# Patient Record
Sex: MALE | Race: WHITE | Employment: FULL TIME | URBAN - METROPOLITAN AREA
[De-identification: names, ages, dates, MRNs, and addresses within clinical notes are randomized per-mention and may not be internally consistent; named-entity substitution may affect disease eponyms.]

---

## 2017-01-03 ENCOUNTER — APPOINTMENT (OUTPATIENT)
Dept: PHYSICAL THERAPY | Age: 47
End: 2017-01-03
Payer: OTHER GOVERNMENT

## 2017-01-06 ENCOUNTER — HOSPITAL ENCOUNTER (OUTPATIENT)
Dept: PHYSICAL THERAPY | Age: 47
Discharge: HOME | End: 2017-01-06
Payer: OTHER GOVERNMENT

## 2017-01-06 PROCEDURE — 97113 AQUATIC THERAPY/EXERCISES: CPT

## 2017-01-06 NOTE — PROGRESS NOTES
PT DAILY TREATMENT NOTE 8-    Patient Name: Candace Henderson  Date:2017  : 1970  [x]  Patient  Verified  Payor: [address] [place] [address] / Plan: [address] [place] [address] / Product Type: Workers Comp /    In time:3:07pm  Out time:3:35pm  Total Treatment Time (min): 28  Visit #: 4 of 8    Treatment Area: Other intervertebral disc degeneration, lumbar region [M51.36]  Right hip pain [M25.551]  Muscle spasm of back [M62.830]    SUBJECTIVE  Pain Level (0-10 scale): 1  Any medication changes, allergies to medications, adverse drug reactions, diagnosis change, or new procedure performed?: [x] No    [] Yes (see summary sheet for update)  Subjective functional status/changes:   [] No changes reported  \"Its doing better today, but I haven't really been doing anything lately. \"    OBJECTIVE     28 min Therapeutic Exercise:  [x] See flow sheet : Aquatics   Rationale: increase ROM and increase strength to improve the patients ability to improve ADL ease. With   [] TE   [] TA   [] neuro   [] other: Patient Education: [x] Review HEP    [] Progressed/Changed HEP based on:   [] positioning   [] body mechanics   [] transfers   [] heat/ice application    [] other:      Other Objective/Functional Measures:     Reports increased \"tension\" in low back post trunk rotations and QL stretch, but did not report until completed treatment     Pain Level (0-10 scale) post treatment: 1    ASSESSMENT/Changes in Function: Pt reports increased discomfort post treatment today as above, but is able to complete all interventions without apparent limitations. Limited subjective progress to date. Will assess in upcoming visits regarding Dc vs progression to land.     Patient will continue to benefit from skilled PT services to modify and progress therapeutic interventions, address functional mobility deficits, address ROM deficits, address strength deficits, analyze and address soft tissue restrictions, analyze and cue movement patterns and analyze and modify body mechanics/ergonomics to attain remaining goals. []  See Plan of Care  []  See progress note/recertification  []  See Discharge Summary         Progress towards goals / Updated goals:  Updated Goals: to be achieved in 4 weeks:  1. Pt will improve FOTO 5 points to increase ease of ADLs-not met score 43. 11/23/16  2. Pt will demonstrate ability to perform 30# box lift and carry 5 rounds without LBP provocation to improve ease of work task completion. 3. Pt will demonstrate ability to perform quadruped alternating progression with good stability to facilitate ability to maintain stability while maneuvering during work tasks. 4. Pt will report ability to ambulate up to 1 mile without pain provocation to improve ease of community environment negotiation.     PLAN  [x]  Upgrade activities as tolerated     [x]  Continue plan of care  []  Update interventions per flow sheet       []  Discharge due to:_  []  Other:_      Romina Eng, PT, DPT, ATC, CSCS 1/6/2017  3:36 PM

## 2017-01-09 ENCOUNTER — APPOINTMENT (OUTPATIENT)
Dept: PHYSICAL THERAPY | Age: 47
End: 2017-01-09
Payer: OTHER GOVERNMENT

## 2017-01-10 ENCOUNTER — HOSPITAL ENCOUNTER (OUTPATIENT)
Dept: PHYSICAL THERAPY | Age: 47
Discharge: HOME | End: 2017-01-10
Payer: OTHER GOVERNMENT

## 2017-01-10 PROCEDURE — 97113 AQUATIC THERAPY/EXERCISES: CPT

## 2017-01-10 NOTE — PROGRESS NOTES
PT DAILY TREATMENT NOTE     Patient Name: Cyndee Diop  DYBW:  : 1970  [x]  Patient  Verified  Payor: [address] [place] [address] / Plan:  [facility] / Product Type: Workers Comp /    In [address] time:3:30  Total Treatment Time (min): 25  Visit #: 5 of 8    Treatment Area: Other intervertebral disc degeneration, lumbar region [M51.36]  Right hip pain [M25.551]  Muscle spasm of back [M62.830]    SUBJECTIVE  Pain Level (0-10 scale): 1  Any medication changes, allergies to medications, adverse drug reactions, diagnosis change, or new procedure performed?: [x] No    [] Yes (see summary sheet for update)  Subjective functional status/changes:   [] No changes reported  \"I've found that the less activity I do the better my back feels. I noticed after not having aqua therapy and then returning my back flared up right after\"    OBJECTIVE    25 min Therapeutic Exercise:  [] See flow sheet :AQUATICS   Rationale: increase ROM, increase strength, improve coordination and increase proprioception to improve the patients ability to improve core activation with daily activities            With   [] TE   [] TA   [] neuro   [] other: Patient Education: [x] Review HEP    [] Progressed/Changed HEP based on:   [] positioning   [] body mechanics   [] transfers   [] heat/ice application    [] other:      Other Objective/Functional Measures: good squat mechanics noted today without reports of lumbar pain     Pain Level (0-10 scale) post treatment: 1    ASSESSMENT/Changes in Function: Pt tolerates aquatic therex well with no direct reports of increased pain during session. He does report some fear avoidance behaviors during session noting he feels like he \"wants to protect\" low back. Received authorization for land based therapy, will transition next session for further functional progression.     Patient will continue to benefit from skilled PT services to modify and progress therapeutic interventions, address functional mobility deficits, address ROM deficits, address strength deficits, analyze and address soft tissue restrictions, analyze and cue movement patterns, analyze and modify body mechanics/ergonomics, assess and modify postural abnormalities and instruct in home and community integration to attain remaining goals. []  See Plan of Care  []  See progress note/recertification  []  See Discharge Summary         Progress towards goals / Updated goals:  Updated Goals: to be achieved in 4 weeks:  1. Pt will improve FOTO 5 points to increase ease of ADLs-not met score 43. 11/23/16  2. Pt will demonstrate ability to perform 30# box lift and carry 5 rounds without LBP provocation to improve ease of work task completion. 3. Pt will demonstrate ability to perform quadruped alternating progression with good stability to facilitate ability to maintain stability while maneuvering during work tasks. 4. Pt will report ability to ambulate up to 1 mile without pain provocation to improve ease of community environment negotiation.  Reporting limited activity/fear avoidance recently 1/10/17    PLAN  []  Upgrade activities as tolerated     [x]  Continue plan of care  []  Update interventions per flow sheet       []  Discharge due to:_  []  Other:_      Jocelynn Cruz DPT 1/10/2017  3:07 PM    Future Appointments  Date Time Provider [place]   1/12/2017 11:30 AM Linsey Yuen MD [place] [id number]   1/12/2017 1:50 PM Lois Diallo  [address]   1/13/2017 2:30 PM [address] [place] [address] [facility]   1/13/2017 3:30 PM [facility]

## 2017-01-12 ENCOUNTER — OFFICE VISIT (OUTPATIENT)
Dept: ORTHOPEDIC SURGERY | Age: 47
End: 2017-01-12

## 2017-01-12 VITALS
TEMPERATURE: 97.8 F | HEIGHT: 68 IN | BODY MASS INDEX: 36.8 KG/M2 | DIASTOLIC BLOOD PRESSURE: 87 MMHG | SYSTOLIC BLOOD PRESSURE: 140 MMHG | HEART RATE: 87 BPM | WEIGHT: 242.8 LBS

## 2017-01-12 VITALS
SYSTOLIC BLOOD PRESSURE: 142 MMHG | WEIGHT: 242 LBS | HEIGHT: 68 IN | HEART RATE: 72 BPM | BODY MASS INDEX: 36.68 KG/M2 | DIASTOLIC BLOOD PRESSURE: 87 MMHG | TEMPERATURE: 98.2 F

## 2017-01-12 DIAGNOSIS — Z98.890 STATUS POST ROTATOR CUFF REPAIR: ICD-10-CM

## 2017-01-12 DIAGNOSIS — M62.830 MUSCLE SPASM OF BACK: ICD-10-CM

## 2017-01-12 DIAGNOSIS — M75.121 COMPLETE TEAR OF RIGHT ROTATOR CUFF: Primary | ICD-10-CM

## 2017-01-12 DIAGNOSIS — M54.50 MIDLINE LOW BACK PAIN WITHOUT SCIATICA, UNSPECIFIED CHRONICITY: ICD-10-CM

## 2017-01-12 DIAGNOSIS — S33.5XXD SPRAIN OF LUMBAR REGION, SUBSEQUENT ENCOUNTER: ICD-10-CM

## 2017-01-12 DIAGNOSIS — M51.36 DDD (DEGENERATIVE DISC DISEASE), LUMBAR: ICD-10-CM

## 2017-01-12 DIAGNOSIS — M47.816 LUMBAR FACET ARTHROPATHY: Primary | ICD-10-CM

## 2017-01-12 RX ORDER — METAXALONE 800 MG/1
800 TABLET ORAL 3 TIMES DAILY
Qty: 75 TAB | Refills: 2 | Status: SHIPPED | OUTPATIENT
Start: 2017-01-12

## 2017-01-12 RX ORDER — TRAMADOL HYDROCHLORIDE 50 MG/1
50 TABLET ORAL
Qty: 40 TAB | Refills: 0 | Status: SHIPPED | OUTPATIENT
Start: 2017-01-12 | End: 2017-01-20

## 2017-01-12 RX ORDER — DICLOFENAC SODIUM 75 MG/1
75 TABLET, DELAYED RELEASE ORAL 2 TIMES DAILY WITH MEALS
Qty: 60 TAB | Refills: 2 | Status: SHIPPED | OUTPATIENT
Start: 2017-01-12 | End: 2017-01-12 | Stop reason: CLARIF

## 2017-01-12 RX ORDER — DICLOFENAC SODIUM 75 MG/1
75 TABLET, DELAYED RELEASE ORAL 2 TIMES DAILY WITH MEALS
Qty: 60 TAB | Refills: 2 | Status: SHIPPED | OUTPATIENT
Start: 2017-01-12

## 2017-01-12 NOTE — PATIENT INSTRUCTIONS
Low Back Arthritis: Exercises  Your Care Instructions  Here are some examples of typical rehabilitation exercises for your condition. Start each exercise slowly. Ease off the exercise if you start to have pain. Your doctor or physical therapist will tell you when you can start these exercises and which ones will work best for you. When you are not being active, find a comfortable position for rest. Some people are comfortable on the floor or a medium-firm bed with a small pillow under their head and another under their knees. Some people prefer to lie on their side with a pillow between their knees. Don't stay in one position for too long. Take short walks (10 to 20 minutes) every 2 to 3 hours. Avoid slopes, hills, and stairs until you feel better. Walk only distances you can manage without pain, especially leg pain. How to do the exercises  Pelvic tilt    1. Lie on your back with your knees bent. 2. \"Brace\" your stomach--tighten your muscles by pulling in and imagining your belly button moving toward your spine. 3. Press your lower back into the floor. You should feel your hips and pelvis rock back. 4. Hold for 6 seconds while breathing smoothly. 5. Relax and allow your pelvis and hips to rock forward. 6. Repeat 8 to 12 times. Back stretches    1. Get down on your hands and knees on the floor. 2. Relax your head and allow it to droop. Round your back up toward the ceiling until you feel a nice stretch in your upper, middle, and lower back. Hold this stretch for as long as it feels comfortable, or about 15 to 30 seconds. 3. Return to the starting position with a flat back while you are on your hands and knees. 4. Let your back sway by pressing your stomach toward the floor. Lift your buttocks toward the ceiling. 5. Hold this position for 15 to 30 seconds. 6. Repeat 2 to 4 times. Follow-up care is a key part of your treatment and safety.  Be sure to make and go to all appointments, and call your doctor if you are having problems. It's also a good idea to know your test results and keep a list of the medicines you take. Where can you learn more? Go to http://aneudy-swati.info/. Enter R775 in the search box to learn more about \"Low Back Arthritis: Exercises. \"  Current as of: May 23, 2016  Content Version: 11.1  © 5743-3685 [employer]. Care instructions adapted under license by [employer] (which disclaims liability or warranty for this information). If you have questions about a medical condition or this instruction, always ask your healthcare professional. [place] [address] any warranty or liability for your use of this information. Learning About Medial Branch Block and Neurotomy  What are medial branch block and neurotomy? Facet joints connect your vertebrae to each other. Problems in these joints can cause chronic (long-term) pain in the neck or back. They can sometimes affect the shoulders, arms, buttocks, or legs. Medial branch nerves are the nerves that carry many of the pain messages from your facet joints. Radiofrequency medial branch neurotomy is a type of medial branch neurotomy that is used to relieve arthritis pain. It uses radio waves to damage nerves in your neck or back so that they can no longer send pain messages to your brain. Before your doctor knows if a neurotomy will help you, he or she will do a medial branch block to find out if certain nerves are the ones that are a source of your pain. You will need two separate visits to the outpatient center or hospital to have both procedures. How is a medial branch block done? The doctor will use a tiny needle to numb the skin where you will get the block. Then he or she puts the block needle into the numbed area. You may feel some pressure, but you should not feel pain.  Using fluoroscopy (live X-ray) to guide the needle, the doctor injects medicine onto one or more nerves to make them numb. If you get relief from your pain in the next 4 to 6 hours, it's a sign that those nerves may be contributing to your pain. The relief will last only a short time. You may then have a medial branch neurotomy at a later visit to try to get longer relief. It takes 20 to 30 minutes to get the block. You can go home after the doctor watches you for about an hour. You will get instructions on how to report how much pain you have when you are at home. You will need someone to drive you home. How is medial branch neurotomy done? The doctor will use a tiny needle to numb the skin where you will get the neurotomy. Then he or she puts the neurotomy needle into the numbed area. You may feel some pressure. Using fluoroscopy (live X-ray) to guide the needle, the doctor sends radio waves through the needle to the nerve for 60 to 90 seconds. The radio waves heat the nerve, which damages it. The doctor may do this several times. And he or she may treat more than one nerve. It takes 45 to 90 minutes to get a neurotomy, depending on how many nerves are heated. You will probably go home 30 to 60 minutes later. You will need someone to drive you home. What can you expect after a neurotomy? You may feel a little sore or tender at the injection site at first. But after a successful neurotomy, most people have pain relief right away. It often lasts for 9 to 12 months or longer. Sometimes the pain relief is permanent. If your pain does come back, it may mean that the damaged nerve has healed and can send pain messages again. Or it can mean that a different nerve is causing pain. Your doctor will discuss your options with you. Follow-up care is a key part of your treatment and safety. Be sure to make and go to all appointments, and call your doctor if you are having problems. It's also a good idea to know your test results and keep a list of the medicines you take. Where can you learn more?   Go to http://aneudy-swati.info/. Enter T504 in the search box to learn more about \"Learning About Medial Branch Block and Neurotomy. \"  Current as of: February 19, 2016  Content Version: 11.1  © 5923-6410 [employer]. Care instructions adapted under license by [employer] (which disclaims liability or warranty for this information). If you have questions about a medical condition or this instruction, always ask your healthcare professional. [address] any warranty or liability for your use of this information.

## 2017-01-12 NOTE — PROGRESS NOTES
[facility]  [address] [place], [address]  Phone: [phone]  Fax: [phone]          HISTORY OF PRESENT ILLNESS:  Kayleigh Linda is a 48 y.o. male with history of lumbar pain. Pt wears a multi-tool on his belt and thinks it may be hitting his back when climbing stairs, causing right lower back pain. He continues with physical therapy at this time. Pt had right shoulder surgery with Dr. Emilia Albrecht 4 months ago. He followed up with Dr. Emilia Albrecht today and will continue with light duty until 03/2017. Of note Dr. Emilia Albrecht put restrictions of up to 10 lbs of pushing, pulling, and limiting climbing with the right shoulder and use of the right arm. He will continue right shoulder physical therapy for 1 month. Pt has been taking ibuprofen 200 mg 3 x daily. He states that he also takes Skelaxin with his ibuprofen. Pt admits to relief when taking pain medication after his right shoulder surgery. Pt at this time desires proceed with medication evaluation. Of note, Pt works as a . Pain Scale: 2/10    PCP: Milton Garibay MD (Inactive)       Past Medical History   Diagnosis Date    Chronic pain      back pain    Elevated cholesterol     Hypertension     Sleep apnea      CPAP        Social History     Social History    Marital status:      Spouse name: N/A    Number of children: N/A    Years of education: N/A     Occupational History    Not on file. Social History Main Topics    Smoking status: Former Smoker     Packs/day: 1.00     Years: 20.00     Quit date: 1/1/2003    Smokeless tobacco: Never Used    Alcohol use No    Drug use: No    Sexual activity: Not on file     Other Topics Concern    Not on file     Social History Narrative       Current Outpatient Prescriptions   Medication Sig Dispense Refill    traMADol (ULTRAM) 50 mg tablet Take 1 Tab by mouth every six (6) hours as needed for Pain.  Max Daily Amount: 200 mg. 40 Tab 0    metaxalone (SKELAXIN) 800 mg tablet Take 1 Tab by mouth three (3) times daily. Take as needed for muscle spasm  Indications: MUSCLE SPASM 75 Tab 2    diclofenac EC (VOLTAREN) 75 mg EC tablet Take 1 Tab by mouth two (2) times daily (with meals). 60 Tab 2    traMADol (ULTRAM) 50 mg tablet Take 1 Tab by mouth every six (6) hours as needed for Pain. Max Daily Amount: 200 mg. 40 Tab 0    ibuprofen (MOTRIN) 200 mg tablet Take 200 mg by mouth.  lidocaine (LIDODERM) 5 % Apply patch to the affected area for 12 hours a day and remove for 12 hours a day. 30 Patch 0    traMADol (ULTRAM) 50 mg tablet Take 1 Tab by mouth every six (6) hours as needed for Pain. Max Daily Amount: 200 mg. 40 Tab 0    traMADol (ULTRAM) 50 mg tablet Take 1 Tab by mouth every six (6) hours as needed for Pain. Max Daily Amount: 200 mg. 40 Tab 0    fenofibrate micronized (LOFIBRA) 134 mg capsule 134 mg daily. 1    fluticasone (FLONASE) 50 mcg/actuation nasal spray 2 Sprays by Both Nostrils route as needed. 1    lisinopril (PRINIVIL, ZESTRIL) 40 mg tablet Take 40 mg by mouth daily. Takes at 11 pm      traMADol (ULTRAM) 50 mg tablet Take 1 Tab by mouth every six (6) hours as needed for Pain. Max Daily Amount: 200 mg. 40 Tab 0    HYDROcodone-acetaminophen (NORCO) 7.5-325 mg per tablet TAKE 1-2 TABLETS BY MOUTH NIGHTLY AS NEEDED FOR PAIN. MAX DAILY 2 TABS  0    traMADol (ULTRAM) 50 mg tablet Take 1 Tab by mouth every six (6) hours as needed for Pain. Max Daily Amount: 200 mg. 40 Tab 0    albuterol (PROVENTIL HFA, VENTOLIN HFA, PROAIR HFA) 90 mcg/actuation inhaler Take  by inhalation.  methocarbamol (ROBAXIN) 500 mg tablet Take 2 Tabs by mouth four (4) times daily. 240 Tab 1    oxyCODONE-acetaminophen (PERCOCET) 5-325 mg per tablet Take 1 Tab by mouth every four (4) hours as needed for Pain. Max Daily Amount: 6 Tabs.  40 Tab 0    oxyCODONE-acetaminophen (PERCOCET)  mg per tablet Take 1 Tab by mouth every four (4) hours as needed for Pain. Max Daily Amount: 6 Tabs. 40 Tab 0    HYDROmorphone (DILAUDID) 2 mg tablet Take 1 Tab by mouth every four (4) hours as needed for Pain. Max Daily Amount: 12 mg. 40 Tab 0    HYDROmorphone (DILAUDID) 2 mg tablet Take 1 Tab by mouth every four (4) hours as needed for Pain. Max Daily Amount: 12 mg. 40 Tab 0       No Known Allergies      REVIEW OF SYSTEMS    Constitutional: Negative for fever, chills, or weight change. Respiratory: Negative for cough or shortness of breath. Cardiovascular: Negative for chest pain or palpitations. Gastrointestinal: Negative for acid reflux, change in bowel habits, or constipation. Genitourinary: Negative for dysuria and flank pain. Musculoskeletal: Positive for lumbar pain. Skin: Negative for rash. Neurological: Negative for headaches, dizziness, or numbness. Endo/Heme/Allergies: Negative for increased bruising. Psychiatric/Behavioral: Negative for difficulty with sleep. PHYSICAL EXAMINATION  Visit Vitals    /87 (BP 1 Location: Left arm, BP Patient Position: Sitting)    Pulse 72    Temp 98.2 °F (36.8 °C) (Oral)    Ht 5' 8\" (1.727 m)    Wt 242 lb (109.8 kg)    BMI 36.8 kg/m2       Constitutional: Awake, alert, and in no acute distress  Neurological: 1+ symmetrical DTRs in the upper extremities. 1+ symmetrical DTRs in the lower extremities. Sensation to light touch is intact. Negative Chasity's sign bilaterally. Skin: warm, dry, and intact. Musculoskeletal: Tenderness to palpation in the lower lumbar region. Moderate pain with extension and axial loading. No pain with internal or external rotation of his hips. Negative straight leg raise bilaterally.      Hip Flex  Quads Hamstrings Ankle DF EHL Ankle PF   Right +4/5 +4/5 +4/5 +4/5 +4/5 +4/5   Left +4/5 +4/5 +4/5 +4/5 +4/5 +4/5     IMAGING:    Lumbar Spine MRI from 10/15/2016 was personally reviewed with the Pt and demonstrated:  IMPRESSION:  L5-S1: Bilateral facet joint arthrosis with hypertrophy mildly narrowing the neural foramina. L4-5: Right facet joint hypertrophy and mild right disc bulging mildly narrowing the right neural foramen. Inna Crum was seen today for back pain and follow-up. Diagnoses and all orders for this visit:    Lumbar facet arthropathy ([address]  -     SCHEDULE SURGERY  -     Discontinue: diclofenac EC (VOLTAREN) 75 mg EC tablet; Take 1 Tab by mouth two (2) times daily (with meals). -     diclofenac EC (VOLTAREN) 75 mg EC tablet; Take 1 Tab by mouth two (2) times daily (with meals). Muscle spasm of back  -     metaxalone (SKELAXIN) 800 mg tablet; Take 1 Tab by mouth three (3) times daily. Take as needed for muscle spasm  Indications: MUSCLE SPASM    Midline low back pain without sciatica, unspecified chronicity  -     metaxalone (SKELAXIN) 800 mg tablet; Take 1 Tab by mouth three (3) times daily. Take as needed for muscle spasm  Indications: MUSCLE SPASM    DDD (degenerative disc disease), lumbar         IMPRESSION AND PLAN:  Karolyn Cross is a 48 y.o. male with history of lumbar pain. Pt wears a multi-tool on his belt and thinks it may be hitting his back when climbing stairs, causing right lower back pain. He continues with physical therapy at this time. Pt had shoulder surgery with Dr. Migdalia Arnold 4 months ago and will continue with light duty for the next 6 weeks. 1) Pt was given information on lumbar arthritis exercises. 2) I recommended the Pt lose weight. 3) Discussed treatment options with the Pt, including steroid injections and a RFA. 4) Pt was scheduled for a right L5-S1 facet injection. 5) He was prescribed Voltaren 75 mg 1 tab BID with food to take in place of ibuprofen. 6) Pt received a refill of Skelaxin 800 mg 1 tab TID prn muscle spasm. 7) He will discontinue physical therapy since I feel he has reached maximum medical improvement.    8) Mr. Kathie Barrera has a reminder for a \"due or due soon\" health maintenance. I have asked that he contact his primary care provider, Jersey Marroquin MD (Inactive),  for follow-up on this health maintenance. 9)  reviewed. 10) Pt will follow-up in 1 month.       Written by Maria Isabel Mathur, as dictated by Estrellita Momin MD.

## 2017-01-12 NOTE — PROGRESS NOTES
Adolfo Born  1970   Chief Complaint   Patient presents with    Shoulder Pain     Right        HISTORY OF PRESENT ILLNESS  Adolfo Eisenberg is a 48 y.o. male who presents today for reevaluation of right shoulder. He is s/p right shoulder arthroscopic massive rotator cuff repair and open subscapularis repair on 9/2/16. He is back at work with restrictions. He states his shoulder is doing well. He has continued with PT. Patient denies any fever, chills, chest pain, shortness of breath or calf pain. There are no new illness or injuries to report since last seen in the office. There are no changes to medications, allergies, family or social history. PHYSICAL EXAM:   Visit Vitals    /87    Pulse 87    Temp 97.8 °F (36.6 °C) (Oral)    Ht 5' 8\" (1.727 m)    Wt 242 lb 12.8 oz (110.1 kg)    BMI 36.92 kg/m2     The patient is a well-developed, well-nourished male   in no acute distress. The patient is alert and oriented times three. The patient is alert and oriented times three. Mood and affect are normal.  LYMPHATIC: lymph nodes are not enlarged and are within normal limits  SKIN: normal in color and non tender to palpation. There are no bruises or abrasions noted. NEUROLOGICAL: Motor sensory exam is within normal limits. Reflexes are equal bilaterally. There is normal sensation to pinprick and light touch  ORTHOPEDIC EXAM:  Examination Right shoulder   Skin Intact   AC joint tenderness -   Biceps tenderness -   Forward flexion/Elevation    Active abduction    Glenohumeral abduction 90   External rotation ROM 90   Internal rotation ROM 70   Apprehension -   Kathryns Relocation -   Jerk -   Load and Shift -   Obriens -   Speeds -   Impingement sign -   Supraspinatus/Empty Can -, 5/5   External Rotation Strength -, 5/5   Lift Off/Belly Press -, 5/5   Neurovascular Intact         IMPRESSION:      ICD-10-CM ICD-9-CM    1.  Complete tear of right rotator cuff M75.121 727.61 REFERRAL TO PHYSICAL THERAPY      traMADol (ULTRAM) 50 mg tablet   2. Status post rotator cuff repair Z98.890 V45.89 REFERRAL TO PHYSICAL THERAPY      traMADol (ULTRAM) 50 mg tablet        PLAN: 1. He will continue work with restrictions and gradually increase his activities as tolerated. 2. He will follow up in about 6 weeks to discuss work status and possible FCE. 3. He will continue PT for one month and then HEP.      Follow-up Disposition: Not on File    Scribed by Adan Thorpe [address]) as dictated by MD Jewell Grier M.D.   [facility]

## 2017-01-12 NOTE — PATIENT INSTRUCTIONS
Rotator Cuff Rehabilitation  What is rotator cuff rehabilitation? Rotator cuff rehabilitation is a series of exercises you do after your surgery. It helps you get back your shoulder's range of motion and strength. You will work with your doctor and physical therapist to plan this exercise program. To get the best results, you need to do the exercises correctly and as often as your doctor tells you. Before you start any exercises, talk with your doctor or physical therapist. It is important that you know exactly how to do the exercises. Stop and call your doctor if you are not sure that you are doing the exercises correctly or if you have any pain. Hearing clicks and pops during exercise is not always cause for concern, but a grinding feeling may mean a more serious problem. Ice your shoulder after exercising if it is sore. Follow-up care is a key part of your treatment and safety. Be sure to make and go to all appointments, and call your doctor if you are having problems. It's also a good idea to know your test results and keep a list of the medicines you take. Stretching exercises  Do not start doing stretching exercises until your doctor says you can. Your doctor will tell you which exercises to do, and how often and how long to do them. Posterior stretch  · Stand upright with your feet shoulder-width apart. · Put the hand of your affected arm on the opposite shoulder, and hold the elbow to your body. · Then, using your good arm, hold the elbow of your affected arm and move it gently up, away from, and across your body. External rotation  · Hold a lightweight stick or sorin in your good arm. It should be about 2 feet long. A curtain sorin may work well. · Lie on your back with your elbows next to your sides. Rest the elbow of your affected arm on a small pillow or folded towel. · Set your arms so that the elbows are bent at a 90-degree angle, like the letter \"L. \" Your hands will point straight up.  · Hold the stick with both hands. Use your good arm to push the stick toward the affected arm so the affected arm moves outward, away from your body. Stop when you feel the arm stretching. Strength exercises  Do not start strength exercises until your doctor says you can. Usually, this is at least 6 to 8 weeks after surgery. Your doctor will tell you how often and how long to do the exercises. Arm raises to the side  · Stand upright with your feet shoulder-width apart and your affected arm at your side. · Slowly raise your injured arm to the side, with your thumb facing up. Raise your arm 60 degrees at the most (shoulder level is 90 degrees). · After holding the position for 3 to 5 seconds, lower your arm back to your side. If you need to, bring your \"good\" arm across your body and place it under the elbow as you lower your injured arm. Use your good arm to keep your injured arm from dropping down too fast during the downward motion. · Repeat 8 to 12 times. · When you first start out, don't hold any additional weight in your hand. As your strength improves, you may use a 1- to 2-pound dumbbell or a small can of food. Shoulder flexor  · Stand facing a wall. Your body should be about 6 inches away from the wall. · Keep your affected arm and elbow to your side, and bend your elbow so that your arm is pointing toward the wall. · Make a closed fist with your thumb on top. · Push your hand into the wall and hold it for 6 seconds. Push with 25% to 50% of the force you have. Shoulder extension  · Stand with your back flat against a wall. · Keep your affected arm and elbow at your side, and bend your elbow so that your upper arm is against the wall and your lower arm is pointing straight ahead. Make a closed fist with your thumb on top. · Push your elbow gently back against the wall, holding for 6 seconds. Push with 25% to 50% of the force you have. Where can you learn more?   Go to http://aneudy-swati.info/. Enter Y900 in the search box to learn more about \"Rotator Cuff Rehabilitation. \"  Current as of: May 23, 2016  Content Version: 11.1  © 8157-6979 [employer]. Care instructions adapted under license by [employer] (which disclaims liability or warranty for this information). If you have questions about a medical condition or this instruction, always ask your healthcare professional. [place] [address] any warranty or liability for your use of this information.

## 2017-01-12 NOTE — LETTER
Nasir Block Request Form for [facility]  Fax to [phone]  Telephone: [phone] To be completed by Physician Office: 
 
Date: 2017    Requested by: Barrington Luna Phone No: [phone]   Fax No:  [phone] Surgery Date: 2017   Requested Time: 2:30 Provider: Kassandra Hansen   
 
CPT CODE*  Procedure 44770 FACET BLOCK RT L5/S1 *CPT code is required for ALL procedures. Patient Information: 
 
Name: Remington Salazar 
 
SSN: [id number]  : 1970   Male/Female: male Home Phone No: [phone] (home) Primary Insurance: [address] [place] [address] Avenue Number: [id number]  [address] [place] [address] RN APPROVED MZGY#[id number] ICD10 code(s): M12.88 ICD9 code:    Diagnosis:  LUMBAR FACET ARTHROPATHY Diabetic/finger stick to be done BS level must be <200 mg/dl Anesthesia Type Local: Valium 10 mg PO 30 minutes prior to procedure

## 2017-01-13 ENCOUNTER — HOSPITAL ENCOUNTER (OUTPATIENT)
Dept: PHYSICAL THERAPY | Age: 47
Discharge: HOME | End: 2017-01-13
Payer: OTHER GOVERNMENT

## 2017-01-13 ENCOUNTER — HOSPITAL ENCOUNTER (OUTPATIENT)
Dept: PHYSICAL THERAPY | Age: 47
End: 2017-01-13
Payer: OTHER GOVERNMENT

## 2017-01-13 PROCEDURE — 97112 NEUROMUSCULAR REEDUCATION: CPT

## 2017-01-13 PROCEDURE — 97110 THERAPEUTIC EXERCISES: CPT

## 2017-01-13 NOTE — PROGRESS NOTES
PT DAILY TREATMENT NOTE     Patient Name: Anisa Martinez  Date:2017  : 1970  [x]  Patient  Verified  Payor: DEPARTMENT OF LABOR / Plan: [address] [place] [address] Avenue / Product Type: Workers Comp /    In time:2:30pm  Out time:3:26pm  Total Treatment Time (min): 56  Visit #: 7 of     Treatment Area: Right shoulder pain [M25.511]    SUBJECTIVE  Pain Level (0-10 scale): 1  Any medication changes, allergies to medications, adverse drug reactions, diagnosis change, or new procedure performed?: [x] No    [] Yes (see summary sheet for update)  Subjective functional status/changes:   [] No changes reported  \"It still gets sore on me every now and then. \" Pt reports his surgeon advised him to continue therapy another month. OBJECTIVE    34 min Therapeutic Exercise:  [x] See flow sheet : HEP review and instruction, including gradual progression of resistance as tolerated. Rationale: increase strength and improve coordination to improve the patients ability to improve ADL ease. 12 min Neuromuscular Re-education:  [x]  See flow sheet :   Rationale: increase strength, improve coordination and increase proprioception  to improve the patients ability to improve ease of OH reach. Modality rationale: decrease pain to improve the patients ability to improve ADL ease.    Min Type Additional Details    [] Estim:  []Unatt       []IFC  []Premod                        []Other:  []w/ice   []w/heat  Position:  Location:    [] Estim: []Att    []TENS instruct  []NMES                    []Other:  []w/US   []w/ice   []w/heat  Position:  Location:    []  Traction: [] Cervical       []Lumbar                       [] Prone          []Supine                       []Intermittent   []Continuous Lbs:  [] before manual  [] after manual    []  Ultrasound: []Continuous   [] Pulsed                           []1MHz   []3MHz Location:  W/cm2:    []  Iontophoresis with dexamethasone         Location: [] Take home patch   [] In clinic   10 [x]  Ice     []  heat  []  Ice massage  []  Laser   []  Anodyne Position: seated  Location: shoulder    []  Laser with stim  []  Other: Position:  Location:    []  Vasopneumatic Device Pressure:       [] lo [] med [] hi   Temperature: [] lo [] med [] hi   [] Skin assessment post-treatment:  []intact []redness- no adverse reaction    []redness  adverse reaction:           With   [] TE   [] TA   [] neuro   [] other: Patient Education: [x] Review HEP    [] Progressed/Changed HEP based on:   [] positioning   [] body mechanics   [] transfers   [] heat/ice application    [x] other: issued several different resistance therapy bands to utilize with HEP     Other Objective/Functional Measures:     Shoulder Flexion MMT: 4/5 R, 5/5 L  Shoulder ABD MMT: 4/5 R, 4+/5 L  Shoulder ER: 4/5 R, 4+/5 L    Full shoulder AROM including against light resistance, minimal scapular elevation with active abduction against resistance. Pain Level (0-10 scale) post treatment: 2    ASSESSMENT/Changes in Function: Pt has progressed well to date, demonstrating full shoulder ROM and grossly 4/5 R shoulder strength. Minimal strength deficits compared to contralateral side, but he has been instructed in updated HEP to address this, including instruction to gradually progress resistance and sets as tolerated. At this time, pt will be DC from PT to continue gradual progression towards full activity. Patient will continue to benefit from skilled PT services to modify and progress therapeutic interventions, address functional mobility deficits, address ROM deficits, address strength deficits, analyze and address soft tissue restrictions, analyze and cue movement patterns, analyze and modify body mechanics/ergonomics, assess and modify postural abnormalities and instruct in home and community integration to attain remaining goals.      [x]  See Plan of Care   []  See progress note/recertification  []  See Discharge Summary    Progress towards goals / Updated goals:  1. Pt will demonstrate shoulder ABD to 4/5 to improve ease of OH reach. Not met 4-/5 R 12/22/2016; Met 4/5 MMT 12/30/2016.   2. Pt will demonstrate 155 degrees shoulder elevation without UT compensatory movement to improve ADL ease. Met - full range noted today without compensations when no weight added. 12/20/2016. 3. Pt will be independent with HEP interventions to facilitate self management and progress towards return to PLOF. Progressed - updated HEP 12/28/2016; Pt still requires cues for form and progressions. 12/30/2016. PLAN  []  Upgrade activities as tolerated     []  Continue plan of care  []  Update interventions per flow sheet       [x]  Discharge due to:_Goals met, pt instructed in continuing HEP with instructions for gradual progression and to avoid heavy activity with involved shoulder.   []  Other:_      Keila Burris, PT, DPT, ATC, CSCS 1/13/2017  2:27 PM

## 2017-01-13 NOTE — PROGRESS NOTES
[facility]  [address]  [place], [address]  [phone] fax    Physical Therapy Discharge Summary  Patient name: Patricia Reese Start of Care: 2016   Referral source: Guera Matthew MD : 1970   Medical/Treatment Diagnosis: Other intervertebral disc degeneration, lumbar region [M51.36]  Right hip pain [M25.551]  Muscle spasm of back [M62.830] Onset Date:2016   Prior Hospitalization: see medical history Provider#: [id number]   Medications: Verified on Patient Summary List     Comorbidities: L/S pathology; HTN  Prior Level of Function: RHD; worked at [employer] as [employer] ; I with all ADLs/daily activities  Visits from Start of Care: 16    Missed Visits: 1   Reporting Period : 2016 to 1/10/2016      Summary of Care:  Updated Goals: to be achieved in 4 weeks:  1. Pt will improve FOTO 5 points to increase ease of ADLs-not met score 43. 16  2. Pt will demonstrate ability to perform 30# box lift and carry 5 rounds without LBP provocation to improve ease of work task completion. 3. Pt will demonstrate ability to perform quadruped alternating progression with good stability to facilitate ability to maintain stability while maneuvering during work tasks. 4. Pt will report ability to ambulate up to 1 mile without pain provocation to improve ease of community environment negotiation. Reporting limited activity/fear avoidance recently 1/10/17      ASSESSMENT/RECOMMENDATIONS:  Pt has made minimal progress with functional task performance at this time. He still demonstrates fear avoidance behaviors reporting that he feels the need to \"protect\" his back when participating in activity. He has made some progress in core strength but limited carryover. Pt will be D/C at this time per MD plan to begin injections.     [x]Discontinue therapy: []Patient has reached or is progressing toward set goals      []Patient is non-compliant or has abdicated      [x]Due to lack of appreciable progress towards set [address], BERNARDO 1/13/2017 4:43 PM

## 2017-01-17 DIAGNOSIS — S33.5XXD SPRAIN OF LUMBAR REGION, SUBSEQUENT ENCOUNTER: Primary | ICD-10-CM

## 2017-01-17 PROBLEM — S33.5XXA SPRAIN OF LUMBAR REGION: Status: ACTIVE | Noted: 2017-01-17

## 2017-01-25 ENCOUNTER — SURGERY (OUTPATIENT)
Age: 47
End: 2017-01-25

## 2017-01-25 ENCOUNTER — HOSPITAL ENCOUNTER (OUTPATIENT)
Age: 47
Setting detail: OUTPATIENT SURGERY
Discharge: HOME | End: 2017-01-25
Attending: PHYSICAL MEDICINE & REHABILITATION | Admitting: PHYSICAL MEDICINE & REHABILITATION
Payer: OTHER GOVERNMENT

## 2017-01-25 ENCOUNTER — APPOINTMENT (OUTPATIENT)
Dept: GENERAL RADIOLOGY | Age: 47
End: 2017-01-25
Attending: PHYSICAL MEDICINE & REHABILITATION
Payer: OTHER GOVERNMENT

## 2017-01-25 VITALS
DIASTOLIC BLOOD PRESSURE: 84 MMHG | RESPIRATION RATE: 18 BRPM | BODY MASS INDEX: 36.07 KG/M2 | WEIGHT: 238 LBS | SYSTOLIC BLOOD PRESSURE: 150 MMHG | OXYGEN SATURATION: 97 % | HEIGHT: 68 IN | TEMPERATURE: 98.5 F | HEART RATE: 83 BPM

## 2017-01-25 PROCEDURE — 74011000250 HC RX REV CODE- 250: Performed by: PHYSICAL MEDICINE & REHABILITATION

## 2017-01-25 PROCEDURE — 74011250637 HC RX REV CODE- 250/637: Performed by: PHYSICAL MEDICINE & REHABILITATION

## 2017-01-25 PROCEDURE — 76010000009 HC PAIN MGT 0 TO 30 MIN PROC: Performed by: PHYSICAL MEDICINE & REHABILITATION

## 2017-01-25 PROCEDURE — 74011250636 HC RX REV CODE- 250/636: Performed by: PHYSICAL MEDICINE & REHABILITATION

## 2017-01-25 RX ORDER — LIDOCAINE HYDROCHLORIDE 10 MG/ML
INJECTION, SOLUTION EPIDURAL; INFILTRATION; INTRACAUDAL; PERINEURAL AS NEEDED
Status: DISCONTINUED | OUTPATIENT
Start: 2017-01-25 | End: 2017-01-25 | Stop reason: HOSPADM

## 2017-01-25 RX ORDER — DEXAMETHASONE SODIUM PHOSPHATE 100 MG/10ML
INJECTION INTRAMUSCULAR; INTRAVENOUS AS NEEDED
Status: DISCONTINUED | OUTPATIENT
Start: 2017-01-25 | End: 2017-01-25 | Stop reason: HOSPADM

## 2017-01-25 RX ORDER — DIAZEPAM 5 MG/1
5-20 TABLET ORAL ONCE
Status: COMPLETED | OUTPATIENT
Start: 2017-01-25 | End: 2017-01-25

## 2017-01-25 RX ADMIN — DIAZEPAM 5 MG: 5 TABLET ORAL at 13:50

## 2017-01-25 RX ADMIN — LIDOCAINE HYDROCHLORIDE 10 ML: 10 INJECTION, SOLUTION EPIDURAL; INFILTRATION; INTRACAUDAL; PERINEURAL at 15:09

## 2017-01-25 RX ADMIN — DEXAMETHASONE SODIUM PHOSPHATE 30 MG: 10 INJECTION INTRAMUSCULAR; INTRAVENOUS at 15:09

## 2017-01-25 NOTE — DISCHARGE INSTRUCTIONS
[facility]   ([place])  Dr. Khushi Porter, Dr. Laurence Marlow, Dr. Elicia Ray not drive a car, operate heavy machinery or dangerous equipment for 24 hours. * Activity as tolerated; rest for the remainder of the day. * Resume pre-block medications including those for your family doctor. * Do not drink alcoholic beverages for 24 hours. Alcohol and the medications you have received may interact and cause an adverse reaction. * You may feel better this evening and worse tomorrow, as the numbing medications wears off and the steroid has yet to begin to work. After 48 hrs the steroid should begin to release bringing you relief. * You may shower this evening and remove any bandages. * Avoid hot tubs and heating pads for 24 hours. You may use cold packs on the procedure site as tolerated for the first 24 hours. * If a headache develops, drink plenty of fluids and rest.  Take over the counter medications for headache if needed. If the headache continues longer than 24 hours, call MD at the [address] [place] [address]. [phone]    * Continue taking pain medications as needed. * You may resume your regular diet if tolerated. Otherwise, start with sips of water and advance slowly. * If Diabetic: check your blood sugar three times a day for the next 3 days. If your sugar is greater than 300 call your family doctor. If your sugar is greater than 400, have someone transport you to the nearest Emergency Room. * If you experience any of the following problems, Please Call the [address] [place] [address] at [phone].         * Shortness of Breath    * Fever of 101 or higher    * Nausea / Vomiting    * Severe Headache    * Weakness or numbness in arms or legs that is not      resolving    * Prolonged increase in pain greater than 4 days      DISCHARGE SUMMARY from Nurse      PATIENT INSTRUCTIONS:    After oral sedation, for 24 hours or while taking prescription Narcotics:  · Limit your activities  · Do not drive and operate hazardous machinery  · Do not make important personal or business decisions  · Do  not drink alcoholic beverages  · If you have not urinated within 8 hours after discharge, please contact your surgeon on call. Report the following to your surgeon:  · Excessive pain, swelling, redness or odor of or around the surgical area  · Temperature over 101  · Nausea and vomiting lasting longer than 4 hours or if unable to take medications  · Any signs of decreased circulation or nerve impairment to extremity: change in color, persistent  numbness, tingling, coldness or increase pain  · Any questions            What to do at Home:  Recommended activity: Activity as tolerated, NO DRIVING FOR 12 Hours post injection          *  Please give a list of your current medications to your Primary Care Provider. *  Please update this list whenever your medications are discontinued, doses are      changed, or new medications (including over-the-counter products) are added. *  Please carry medication information at all times in case of emergency situations. These are general instructions for a healthy lifestyle:    No smoking/ No tobacco products/ Avoid exposure to second hand smoke    Surgeon General's Warning:  Quitting smoking now greatly reduces serious risk to your health. Obesity, smoking, and sedentary lifestyle greatly increases your risk for illness    A healthy diet, regular physical exercise & weight monitoring are important for maintaining a healthy lifestyle    You may be retaining fluid if you have a history of heart failure or if you experience any of the following symptoms:  Weight gain of 3 pounds or more overnight or 5 pounds in a week, increased swelling in our hands or feet or shortness of breath while lying flat in bed.   Please call your doctor as soon as you notice any of these symptoms; do not wait until your next office visit. Recognize signs and symptoms of STROKE:    F-face looks uneven    A-arms unable to move or move unevenly    S-speech slurred or non-existent    T-time-call 911 as soon as signs and symptoms begin-DO NOT go       Back to bed or wait to see if you get better-TIME IS BRAIN.

## 2017-01-25 NOTE — PROCEDURES
Facet Joint Block Procedure Note    Patient Name: Kayleigh Linda    Date of Procedure: January 25, 2017    Preoperative Diagnosis: Arthropathy of cervical facet joint [facility]) [M12.88]    Post Operative Diagnosis:Arthropathy of cervical facet joint [facility]) [M12.88]     Procedure:  right L5-S1 Facet Joint Block    Consent: Informed consent was obtained prior to the procedure. The patient was given the opportunity to ask questions regarding the procedure and its associated risks. In addition to the potential risks associated with the procedure itself, the patient was informed both verbally and in writing of potential side effects of the use of glucocorticoids. The patient appeared to comprehend the informed consent and desired to have the procedure perfored. Procedure: The patient was placed in the prone position on the flouroscopy table and the back was prepped and draped in the usual sterile manner. At each blocked level, the exact location of the facet joint was identified with flouroscopy, and after local Lidocaine 1% injection, a #22 gauge spinal needle was then advanced toward the joint. A total of 30 mg of preservative free Dexamethasone and 5 cc of Lidocaine was injected around and equally divided among all of the sites. The patient tolerated the procedure well. The injection area was cleaned and bandaids applied. No excessive bleeding was noted. Patient dressed and was discharged to home with instructions.        Yesenia Mosley MD  January 25, 2017

## 2017-01-25 NOTE — PERIOP NOTES
[address] January 25, 2017       RE: Camilla Green      To Whom It May Concern,    Mr Nenita Panchal had a procedure on 01/25/2017. Mr Debra Sloan needs to remain home on 01/26/2017. This is to certify that Camilla Green may may return to work on 01/27/2017. Please feel free to contact my office, [phone], if you have any questions or concerns. Thank you for your assistance in this matter.       Sincerely,  Dr Meriam Jury MD Reagan Cranker, RN

## 2017-01-25 NOTE — H&P
Date of Surgery Update:  Anisa Martinez was seen and examined. History and physical has been reviewed. The patient has been examined. There have been no significant clinical changes since the completion of the last office visit.     Signed By: Katie Dominguez MD     January 25, 2017 1:59 PM

## 2017-01-26 NOTE — PROGRESS NOTES
[facility]  [address]  [place], [address]  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 Pt will demonstrate shoulder ABD to 4/5 to improve ease of OH reach. Not met 4-/5 R 2016; Met 4/5 MMT 2016.   2. Pt will demonstrate 155 degrees shoulder elevation without UT compensatory movement to improve ADL ease. Met - full range noted today without compensations when no weight added. 2016. 3. Pt will be independent with HEP interventions to facilitate self management and progress towards return to PLOF. Progressed - updated HEP 2016; Pt still requires cues for form and progressions. 2016. ASSESSMENT/RECOMMENDATIONS: Pt has progressed well to date, demonstrating full shoulder ROM and grossly 4/5 R shoulder strength. Minimal strength deficits compared to contralateral side, but he has been instructed in updated HEP to address this, including instruction to gradually progress resistance and sets as tolerated. At this time, pt will be DC from PT to continue gradual progression towards full activity.     [x]Discontinue therapy: [x]Patient has reached or is progressing toward set goals      []Patient is non-compliant or has abdicated      []Due to lack of appreciable progress towards set goals    Lory Byrd, PT, DPT, ATC, CSCS 1/26/2017 2:41 PM

## 2017-02-20 ENCOUNTER — OFFICE VISIT (OUTPATIENT)
Dept: ORTHOPEDIC SURGERY | Age: 47
End: 2017-02-20

## 2017-02-23 ENCOUNTER — OFFICE VISIT (OUTPATIENT)
Dept: ORTHOPEDIC SURGERY | Age: 47
End: 2017-02-23

## 2017-02-23 VITALS
HEIGHT: 68 IN | SYSTOLIC BLOOD PRESSURE: 135 MMHG | BODY MASS INDEX: 35.83 KG/M2 | DIASTOLIC BLOOD PRESSURE: 80 MMHG | WEIGHT: 236.4 LBS | HEART RATE: 78 BPM | TEMPERATURE: 98.6 F

## 2017-02-23 DIAGNOSIS — Z98.890 STATUS POST ROTATOR CUFF REPAIR: Primary | ICD-10-CM

## 2017-02-23 DIAGNOSIS — M75.121 COMPLETE TEAR OF RIGHT ROTATOR CUFF: ICD-10-CM

## 2017-02-23 NOTE — PROGRESS NOTES
Michael Salas  1970   Chief Complaint   Patient presents with    Shoulder Pain     Right        HISTORY OF PRESENT ILLNESS  Michael Salas is a 48 y.o. male who presents today for reevaluation of right shoulder. He is s/p right shoulder arthroscopic massive rotator cuff repair and open subscapularis repair on 9/2/16. He is back at work with restrictions. 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 He has been doing HEP with little benefit. Patient denies any fever, chills, chest pain, shortness of breath or calf pain. There are no new illness or injuries to report since last seen in the office. There are no changes to medications, allergies, family or social history. PHYSICAL EXAM:   Visit Vitals    /80    Pulse 78    Temp 98.6 °F (37 °C) (Oral)    Ht 5' 8\" (1.727 m)    Wt 236 lb 6.4 oz (107.2 kg)    BMI 35.94 kg/m2     The patient is a well-developed, well-nourished male   in no acute distress. The patient is alert and oriented times three. The patient is alert and oriented times three. Mood and affect are normal.  LYMPHATIC: lymph nodes are not enlarged and are within normal limits  SKIN: normal in color and non tender to palpation. There are no bruises or abrasions noted. NEUROLOGICAL: Motor sensory exam is within normal limits. Reflexes are equal bilaterally. There is normal sensation to pinprick and light touch  ORTHOPEDIC EXAM of right shoulder:  Inspection: No swelling, no bruising, effusion not present, no edema  Incision healed  Range of motion: 0-90 passive glenohumeral abduction, able to touch back of head without compensation. No ttp   Stability: Stable  Strength: 4/5     IMPRESSION:      ICD-10-CM ICD-9-CM    1. Status post rotator cuff repair Z98.890 V45.89 REFERRAL TO PHYSICAL THERAPY   2.  Complete tear of right rotator cuff M75.121 727.61 REFERRAL TO PHYSICAL THERAPY        PLAN: Before sending him back to work on full duty, I would like to get a functional capacity evaluation. I will see him back following completion of the FCE. Depending on the results of that he may be released to full duty.       Follow-up Disposition: Not on File    Scribed by Live Cardenas [facility]) as dictated by MD Jorge Andrade M.D.   [facility]

## 2017-02-27 ENCOUNTER — TELEPHONE (OUTPATIENT)
Dept: ORTHOPEDIC SURGERY | Age: 47
End: 2017-02-27

## 2017-02-27 NOTE — TELEPHONE ENCOUNTER
Pt is calling for status of OWC forms and has questions regarding completion. Please advise [phone].

## 2017-03-21 ENCOUNTER — APPOINTMENT (OUTPATIENT)
Dept: PHYSICAL THERAPY | Age: 47
End: 2017-03-21

## 2017-03-28 ENCOUNTER — APPOINTMENT (OUTPATIENT)
Dept: PHYSICAL THERAPY | Age: 47
End: 2017-03-28

## 2017-03-30 ENCOUNTER — OFFICE VISIT (OUTPATIENT)
Dept: ORTHOPEDIC SURGERY | Age: 47
End: 2017-03-30

## 2017-03-30 VITALS
TEMPERATURE: 98 F | OXYGEN SATURATION: 97 % | SYSTOLIC BLOOD PRESSURE: 138 MMHG | HEIGHT: 68 IN | DIASTOLIC BLOOD PRESSURE: 86 MMHG | BODY MASS INDEX: 33.62 KG/M2 | RESPIRATION RATE: 18 BRPM | HEART RATE: 78 BPM | WEIGHT: 221.8 LBS

## 2017-03-30 DIAGNOSIS — M62.830 MUSCLE SPASM OF BACK: ICD-10-CM

## 2017-03-30 DIAGNOSIS — S33.5XXD SPRAIN OF LIGAMENTS OF LUMBAR SPINE, SUBSEQUENT ENCOUNTER: Primary | ICD-10-CM

## 2017-03-30 DIAGNOSIS — M47.816 LUMBAR FACET ARTHROPATHY: ICD-10-CM

## 2017-03-30 PROBLEM — S33.5XXA SPRAIN OF LIGAMENTS OF LUMBAR SPINE: Status: ACTIVE | Noted: 2017-03-30

## 2017-03-30 NOTE — PATIENT INSTRUCTIONS
Low Back Arthritis: Exercises  Your Care Instructions  Here are some examples of typical rehabilitation exercises for your condition. Start each exercise slowly. Ease off the exercise if you start to have pain. Your doctor or physical therapist will tell you when you can start these exercises and which ones will work best for you. When you are not being active, find a comfortable position for rest. Some people are comfortable on the floor or a medium-firm bed with a small pillow under their head and another under their knees. Some people prefer to lie on their side with a pillow between their knees. Don't stay in one position for too long. Take short walks (10 to 20 minutes) every 2 to 3 hours. Avoid slopes, hills, and stairs until you feel better. Walk only distances you can manage without pain, especially leg pain. How to do the exercises  Pelvic tilt    1. Lie on your back with your knees bent. 2. \"Brace\" your stomach--tighten your muscles by pulling in and imagining your belly button moving toward your spine. 3. Press your lower back into the floor. You should feel your hips and pelvis rock back. 4. Hold for 6 seconds while breathing smoothly. 5. Relax and allow your pelvis and hips to rock forward. 6. Repeat 8 to 12 times. Back stretches    1. Get down on your hands and knees on the floor. 2. Relax your head and allow it to droop. Round your back up toward the ceiling until you feel a nice stretch in your upper, middle, and lower back. Hold this stretch for as long as it feels comfortable, or about 15 to 30 seconds. 3. Return to the starting position with a flat back while you are on your hands and knees. 4. Let your back sway by pressing your stomach toward the floor. Lift your buttocks toward the ceiling. 5. Hold this position for 15 to 30 seconds. 6. Repeat 2 to 4 times. Follow-up care is a key part of your treatment and safety.  Be sure to make and go to all appointments, and call your doctor if you are having problems. It's also a good idea to know your test results and keep a list of the medicines you take. Where can you learn more? Go to http://aneudy-swati.info/. Enter H236 in the search box to learn more about \"Low Back Arthritis: Exercises. \"  Current as of: May 23, 2016  Content Version: 11.2  © 2646-1940 [employer]. Care instructions adapted under license by [employer] (which disclaims liability or warranty for this information). If you have questions about a medical condition or this instruction, always ask your healthcare professional. [place] [address] any warranty or liability for your use of this information.

## 2017-03-30 NOTE — PROGRESS NOTES
[facility]  [address] [place], [address]  Phone: [phone]  Fax: [phone]          HISTORY OF PRESENT ILLNESS:  Melanie Hope is a 48 y.o. male with history of lumbar pain. He tried a right L5-S1 facet injection on 01/25/2017 and experienced some relief, reporting that the most relief did not occur until 5 weeks after the injection. Pt reports that he used to experience muscle spasms and has taken Skelaxin 800 mg with relief. Pt was prescribed Voltaren 75 mg and reports that he came off the medication about 10 days ago. He is currently on light duty per Dr. Claudene Imperial. Pt is scheduled for a FCE but has had to reschedule twice due to the flu. He states that his lower back pain returns when he increases his workload or hours. Pt at this time desires to continue with current care. Pain Scale: 1/10    PCP: Yanick Mittal MD (Inactive)       Past Medical History:   Diagnosis Date    Chronic pain     back pain    Elevated cholesterol     Hypertension     Sleep apnea     CPAP        Social History     Social History    Marital status:      Spouse name: N/A    Number of children: N/A    Years of education: N/A     Occupational History    Not on file. Social History Main Topics    Smoking status: Former Smoker     Packs/day: 1.00     Years: 20.00     Quit date: 1/1/2003    Smokeless tobacco: Never Used    Alcohol use No    Drug use: No    Sexual activity: Not on file     Other Topics Concern    Not on file     Social History Narrative       Current Outpatient Prescriptions   Medication Sig Dispense Refill    metaxalone (SKELAXIN) 800 mg tablet Take 1 Tab by mouth three (3) times daily. Take as needed for muscle spasm  Indications: MUSCLE SPASM 75 Tab 2    lidocaine (LIDODERM) 5 % Apply patch to the affected area for 12 hours a day and remove for 12 hours a day.  30 Patch 0    fenofibrate micronized (LOFIBRA) 134 mg capsule 134 mg daily. 1    fluticasone (FLONASE) 50 mcg/actuation nasal spray 2 Sprays by Both Nostrils route as needed. 1    lisinopril (PRINIVIL, ZESTRIL) 40 mg tablet Take 40 mg by mouth daily. Takes at 11 pm      diclofenac EC (VOLTAREN) 75 mg EC tablet Take 1 Tab by mouth two (2) times daily (with meals). 60 Tab 2    ibuprofen (MOTRIN) 200 mg tablet Take 200 mg by mouth.  traMADol (ULTRAM) 50 mg tablet Take 1 Tab by mouth every six (6) hours as needed for Pain. Max Daily Amount: 200 mg. 40 Tab 0    albuterol (PROVENTIL HFA, VENTOLIN HFA, PROAIR HFA) 90 mcg/actuation inhaler Take  by inhalation. No Known Allergies      REVIEW OF SYSTEMS    Constitutional: Positive for 21 lb weight loss. Negative for fever or chills. Respiratory: Negative for cough or shortness of breath. Cardiovascular: Negative for chest pain or palpitations. Gastrointestinal: Negative for acid reflux, change in bowel habits, or constipation. Genitourinary: Negative for dysuria and flank pain. Musculoskeletal: Positive for lumbar pain. Skin: Negative for rash. Neurological: Negative for headaches, dizziness, or numbness. Endo/Heme/Allergies: Negative for increased bruising. Psychiatric/Behavioral: Negative for difficulty with sleep. PHYSICAL EXAMINATION  Visit Vitals    /86 (BP 1 Location: Left arm, BP Patient Position: Sitting)    Pulse 78    Temp 98 °F (36.7 °C) (Oral)    Resp 18    Ht 5' 8\" (1.727 m)    Wt 221 lb 12.8 oz (100.6 kg)    SpO2 97%    BMI 33.72 kg/m2       Constitutional: Awake, alert, and in no acute distress  Neurological: 1+ symmetrical DTRs in the upper extremities. 1+ symmetrical DTRs in the lower extremities. Sensation to light touch is intact. Negative Chasity's sign bilaterally. Skin: warm, dry, and intact. Musculoskeletal: Tenderness to palpation in the lower lumbar region. Moderate pain with extension and axial loading.  No pain with internal or external rotation of his hips. Negative straight leg raise bilaterally. Biceps  Triceps Deltoids Wrist Ext Wrist Flex Hand Intrin   Right +4/5 +4/5 +4/5 +4/5 +4/5 +4/5   Left +4/5 +4/5 +4/5 +4/5 +4/5 +4/5      Hip Flex  Quads Hamstrings Ankle DF EHL Ankle PF   Right +4/5 +4/5 +4/5 +4/5 +4/5 +4/5   Left +4/5 +4/5 +4/5 +4/5 +4/5 +4/5     IMAGING:     Lumbar Spine MRI from 10/15/2016 was personally reviewed with the Pt and demonstrated:  IMPRESSION:  L5-S1: Bilateral facet joint arthrosis with hypertrophy mildly narrowing the neural foramina. L4-5: Right facet joint hypertrophy and mild right disc bulging mildly narrowing the right neural foramen. Jacquelin Fuentes was seen today for back pain. Diagnoses and all orders for this visit:    Sprain of ligaments of lumbar spine, subsequent encounter    Lumbar facet arthropathy ([facility] [address]    Muscle spasm of back         IMPRESSION AND PLAN:  Stanislav Ramos is a 48 y.o. male with history of lumbar pain. He tried a right L5-S1 facet injection on 01/25/2017 and experienced some relief, reporting that the most relief did not occur until 5 weeks after the injection. Pt reports that he used to experience muscle spasms and has taken Skelaxin 800 mg with relief. He is currently on light duty per Dr. Rosalba Webster and is scheduled for a FCE. 1) Pt was given information on lumbar arthritis exercises. 2) Discussed treatment options with the Pt, including steroid injections. 3) I recommended the Pt perform core strengthening exercises, yoga, and water exercise. 4) I recommended the Pt try an inversion table. 5) Pt will continue taking Skelaxin 800 mg as prescribed. 6) Mr. Marilyn Reddy has a reminder for a \"due or due soon\" health maintenance. I have asked that he contact his primary care provider, Lauro Bundy MD (Inactive), for follow-up on this health maintenance. 7)  reviewed. 8) Pt will follow-up in 2 months.       Written by Radha Berman, as dictated by Rupesh Phillip MD.  I, Dr. Rupesh Phillip confirm that all documentation is accurate.

## 2017-03-30 NOTE — MR AVS SNAPSHOT
Visit Information Date & Time Provider Department Dept. Phone Encounter #  
 3/30/2017  3:15 PM Dion Leal MD [facility] MAST  [id number] Follow-up Instructions Return in about 2 months (around 5/30/2017) for Medication follow up. Your Appointments 5/22/2017  3:30 PM  
Follow Up with Dion Leal MD  
[facility] ONE [facility]) Appt Note: 2 MONTH MED FU - NO COPAY WORKCOMP  
 [address] [facility] [address] [phone]  
  
   
 [address] [facility] [address] Upcoming Health Maintenance Date Due DTaP/Tdap/Td series (1 - Tdap) 5/19/1989 INFLUENZA AGE 9 TO ADULT 8/1/2016 Allergies as of 3/30/2017  Review Complete On: 3/30/2017 By: Dion Leal MD  
 No Known Allergies Current Immunizations  Never Reviewed No immunizations on file. Not reviewed this visit You Were Diagnosed With   
  
 Codes Comments Sprain of ligaments of lumbar spine, subsequent encounter    -  Primary ICD-10-CM: S33. 5XXD ICD-9-CM: V58.89, 847.2 Vitals BP Pulse Temp Resp Height(growth percentile) Weight(growth percentile) 138/86 (BP 1 Location: Left arm, BP Patient Position: Sitting) 78 98 °F (36.7 °C) (Oral) 18 5' 8\" (1.727 m) 221 lb 12.8 oz (100.6 kg) SpO2 BMI Smoking Status 97% 33.72 kg/m2 Former Smoker Vitals History BMI and BSA Data Body Mass Index Body Surface Area 33.72 kg/m 2 2.2 m 2 Preferred Pharmacy Pharmacy Name Phone [id number] Radha Wolfe, [address] [facility] [facility] [phone] Your Updated Medication List  
  
   
This list is accurate as of: 3/30/17  4:41 PM.  Always use your most recent med list.  
  
  
  
  
 albuterol 90 mcg/actuation inhaler Commonly known as:  PROVENTIL HFA, VENTOLIN HFA, PROAIR HFA Take  by inhalation. diclofenac EC 75 mg EC tablet Commonly known as:  VOLTAREN Take 1 Tab by mouth two (2) times daily (with meals). fenofibrate micronized 134 mg capsule Commonly known as:  LOFIBRA 134 mg daily. fluticasone 50 mcg/actuation nasal spray Commonly known as:  Freddie Noss 2 Sprays by Both Nostrils route as needed. ibuprofen 200 mg tablet Commonly known as:  MOTRIN Take 200 mg by mouth.  
  
 lidocaine 5 % Commonly known as:  Kylie Mckoy Apply patch to the affected area for 12 hours a day and remove for 12 hours a day. lisinopril 40 mg tablet Commonly known as:  Otis Daft Take 40 mg by mouth daily. Takes at 11 pm  
  
 metaxalone 800 mg tablet Commonly known as:  SKELAXIN Take 1 Tab by mouth three (3) times daily. Take as needed for muscle spasm  Indications: MUSCLE SPASM  
  
 traMADol 50 mg tablet Commonly known as:  ULTRAM  
Take 1 Tab by mouth every six (6) hours as needed for Pain. Max Daily Amount: 200 mg. Follow-up Instructions Return in about 2 months (around 5/30/2017) for Medication follow up. Patient Instructions Low Back Arthritis: Exercises Your Care Instructions Here are some examples of typical rehabilitation exercises for your condition. Start each exercise slowly. Ease off the exercise if you start to have pain. Your doctor or physical therapist will tell you when you can start these exercises and which ones will work best for you. When you are not being active, find a comfortable position for rest. Some people are comfortable on the floor or a medium-firm bed with a small pillow under their head and another under their knees. Some people prefer to lie on their side with a pillow between their knees. Don't stay in one position for too long. Take short walks (10 to 20 minutes) every 2 to 3 hours. Avoid slopes, hills, and stairs until you feel better.  Walk only distances you can manage without pain, especially leg pain. How to do the exercises Pelvic tilt 1. Lie on your back with your knees bent. 2. \"Brace\" your stomachtighten your muscles by pulling in and imagining your belly button moving toward your spine. 3. Press your lower back into the floor. You should feel your hips and pelvis rock back. 4. Hold for 6 seconds while breathing smoothly. 5. Relax and allow your pelvis and hips to rock forward. 6. Repeat 8 to 12 times. Back stretches 1. Get down on your hands and knees on the floor. 2. Relax your head and allow it to droop. Round your back up toward the ceiling until you feel a nice stretch in your upper, middle, and lower back. Hold this stretch for as long as it feels comfortable, or about 15 to 30 seconds. 3. Return to the starting position with a flat back while you are on your hands and knees. 4. Let your back sway by pressing your stomach toward the floor. Lift your buttocks toward the ceiling. 5. Hold this position for 15 to 30 seconds. 6. Repeat 2 to 4 times. Follow-up care is a key part of your treatment and safety. Be sure to make and go to all appointments, and call your doctor if you are having problems. It's also a good idea to know your test results and keep a list of the medicines you take. Where can you learn more? Go to http://aneudy-swati.info/. Enter H859 in the search box to learn more about \"Low Back Arthritis: Exercises. \" Current as of: May 23, 2016 Content Version: 11.2 © 7914-5465 [employer]. Care instructions adapted under license by [employer] (which disclaims liability or warranty for this information). If you have questions about a medical condition or this instruction, always ask your healthcare professional. [place] [address] any warranty or liability for your use of this information. Introducing [facility]! Dear Queen Tito: Thank you for requesting a "[employer] account. Our records indicate that you already have an active "[employer] account. You can access your account anytime at [email or web address]. [employer]/"[employer] Did you know that you can access your hospital and ER discharge instructions at any time in "[employer]? You can also review all of your test results from your hospital stay or ER visit. Additional Information If you have questions, please visit the Frequently Asked Questions section of the "[employer] website at [email or web address]. [employer]/"[employer]/. Remember, "[employer] is NOT to be used for urgent needs. For medical emergencies, dial 911. Now available from your iPhone and Android! Please provide this summary of care documentation to your next provider. Your primary care clinician is listed as [facility]. If you have any questions after today's visit, please call [phone].

## 2017-04-18 ENCOUNTER — APPOINTMENT (OUTPATIENT)
Dept: PHYSICAL THERAPY | Age: 47
End: 2017-04-18

## 2017-04-20 ENCOUNTER — TELEPHONE (OUTPATIENT)
Dept: ORTHOPEDIC SURGERY | Age: 47
End: 2017-04-20

## 2017-04-20 NOTE — TELEPHONE ENCOUNTER
Patient called in to give Dr. Tobin Hill an update on his FCE which has not been completed due to cancellations on the patient's part and as well as Megha. Pallavi has a family emergency and has been out for a bit. If Dr. Tobin Hill wants him to go to another facility to get it done sooner please advise.   Patient can be reached at [phone]

## 2017-05-02 ENCOUNTER — HOSPITAL ENCOUNTER (OUTPATIENT)
Dept: PHYSICAL THERAPY | Age: 47
Discharge: HOME | End: 2017-05-02
Payer: OTHER GOVERNMENT

## 2017-05-02 PROCEDURE — 97750 PHYSICAL PERFORMANCE TEST: CPT

## 2017-05-02 NOTE — PROGRESS NOTES
PHYSICAL THERAPY - DAILY TREATMENT NOTE    Patient Name: Shelley Shetty        Date: 2017  : 1970   Yes-Patient  Verified  Visit #:  1   of   1  Insurance: Payor: DEPARTMENT OF LABOR / Plan: [facility] DEPARTMENT OF LABOR / Product Type: Workers Comp /      In time: 1 Out time: 1130   Total Treatment Time: 180      Medicare Time Tracking (below)   Total Timed Codes (min):   1:1 Treatment Time:       TREATMENT AREA =  Right shoulder pain [M25.511]  Complete rotator cuff tear or rupture of right shoulder, not specified as traumatic [M75.121]    SUBJECTIVE    Pain Level (on 0 to 10 scale):    Medication Changes/New allergies or changes in medical history, any new surgeries or procedures? Yes If yes, update Summary List   Subjective Functional Status/Changes:  []  No changes reported     See FCE         OBJECTIVE    Modality rationale:     min [] Estim, type:                                          []  att     []  unatt     []  w/US     []  w/ice    []  w/heat    min []  Mechanical Traction: type/lbs                                               []  pro   []  sup   []  int   []  cont    min []  Ultrasound, settings/location:      min []  Iontophoresis:  []  take home patch w/ dexamethazone    min                                []  in clinic w/ dexamethazone    min []  Ice     []  Heat     position:     min []  Other:       min Therapeutic Exercise:  []  See flow sheet   []  Other:      []  Added:     to improve (function):    []  Changed:     to improve (function):       min Therapeutic Activity:       min Manual Therapy:       min Gait Training:       min Patient Education:  Yes- Reviewed HEP   []  Progressed/Changed HEP based on:         Other Objective/Functional Measures:    See FCE     Post Treatment Pain Level (on 0 to 10) scale:       ASSESSMENT    []  See Progress Note/Recertification   Patient will continue to benefit from skilled therapy to address remaining functional deficits:   Progress toward goals / Updated goals:         PLAN    []  Upgrade activities as tolerated Yes  Continue plan of care   []  Discharge due to :    []  Other:      Therapist: Chin Cavazos PT, [facility], [address] [place] [address].  MDT    Date: 5/2/2017 Time: 12:03 PM

## 2017-05-03 ENCOUNTER — TELEPHONE (OUTPATIENT)
Dept: ORTHOPEDIC SURGERY | Age: 47
End: 2017-05-03

## 2017-05-18 ENCOUNTER — OFFICE VISIT (OUTPATIENT)
Dept: ORTHOPEDIC SURGERY | Age: 47
End: 2017-05-18

## 2017-05-18 ENCOUNTER — TELEPHONE (OUTPATIENT)
Dept: ORTHOPEDIC SURGERY | Age: 47
End: 2017-05-18

## 2017-05-18 ENCOUNTER — DOCUMENTATION ONLY (OUTPATIENT)
Dept: ORTHOPEDIC SURGERY | Age: 47
End: 2017-05-18

## 2017-05-18 VITALS
HEART RATE: 66 BPM | WEIGHT: 221.6 LBS | HEIGHT: 68 IN | SYSTOLIC BLOOD PRESSURE: 147 MMHG | TEMPERATURE: 97.7 F | BODY MASS INDEX: 33.59 KG/M2 | DIASTOLIC BLOOD PRESSURE: 88 MMHG

## 2017-05-18 DIAGNOSIS — M75.121 COMPLETE TEAR OF RIGHT ROTATOR CUFF: Primary | ICD-10-CM

## 2017-05-18 DIAGNOSIS — Z98.890 STATUS POST ROTATOR CUFF REPAIR: ICD-10-CM

## 2017-05-18 NOTE — TELEPHONE ENCOUNTER
The impairment rating gives an actual disability rating based on the FCE. FCE only gives restricitons. For work injuries etc they are both always necessary.

## 2017-05-18 NOTE — PROGRESS NOTES
PT DROPPED OFF ATTENDING PHYS REPORT FOR DR Praneeth Monique TO COMPLETE.  PLEASE CALL PT [phone] WHEN DONE

## 2017-05-18 NOTE — TELEPHONE ENCOUNTER
Pt seen today states he needs more explanation of what the Impairment Rating is. Patient states he's already had a FCE done.   Please call to discuss with the patient at [phone]

## 2017-05-18 NOTE — PROGRESS NOTES
Leann Goldmann  1970   Chief Complaint   Patient presents with    Shoulder Pain     Right        HISTORY OF PRESENT ILLNESS  Leann Goldmann is a 48 y.o. male who presents today for reevaluation of right shoulder. He rates his pain 0/10 today. He has completed the FCE. He is s/p right shoulder arthroscopic massive rotator cuff repair and open subscapularis repair on 9/2/16. He is back at work with restrictions. 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 He has been doing HEP with little benefit. Patient denies any fever, chills, chest pain, shortness of breath or calf pain. There are no new illness or injuries to report since last seen in the office. There are no changes to medications, allergies, family or social history. PHYSICAL EXAM:   Visit Vitals    /88    Pulse 66    Temp 97.7 °F (36.5 °C) (Oral)    Ht 5' 8\" (1.727 m)    Wt 221 lb 9.6 oz (100.5 kg)    BMI 33.69 kg/m2     The patient is a well-developed, well-nourished male   in no acute distress. The patient is alert and oriented times three. The patient is alert and oriented times three. Mood and affect are normal.  LYMPHATIC: lymph nodes are not enlarged and are within normal limits  SKIN: normal in color and non tender to palpation. There are no bruises or abrasions noted. NEUROLOGICAL: Motor sensory exam is within normal limits. Reflexes are equal bilaterally. There is normal sensation to pinprick and light touch  ORTHOPEDIC EXAM of right shoulder:  Inspection: No swelling, no bruising, effusion not present, no edema  Incision healed  Range of motion: 0-90 passive glenohumeral abduction, able to touch back of head without compensation. No ttp   Stability: Stable  Strength: 4/5     IMPRESSION:      ICD-10-CM ICD-9-CM    1.  Complete tear of right rotator cuff M75.121 727.61 REFERRAL TO PHYSICAL THERAPY   2. Status post rotator cuff repair [id number] V45.89         PLAN: The FCE shows good capabilities and effort. He is now able to return to regular work. I will order an impairment rating. He will return with the results of this. Follow-up Disposition: Not on File    Scribed by Leonardo Christina [address]) as dictated by Vivian Ramsey MD    I, Dr. Vivian Ramsey, confirm that all documentation is accurate.     Vivian Ramesy M.D.   [facility]

## 2017-05-18 NOTE — LETTER
NOTIFICATION RETURN TO WORK / SCHOOL 
 
5/18/2017 8:26 AM 
 
Mr. [address] [place] [address] To Whom It May Concern: 
 
Andrew Holcomb is currently under the care of [address] [place] [address] [place]. He will remain on the same restrictions. An impairment rating has been ordered. If there are questions or concerns please have the patient contact our office. Sincerely, Farnaz Rollins MD

## 2017-05-22 ENCOUNTER — TELEPHONE (OUTPATIENT)
Dept: ORTHOPEDIC SURGERY | Age: 47
End: 2017-05-22

## 2017-05-22 ENCOUNTER — OFFICE VISIT (OUTPATIENT)
Dept: ORTHOPEDIC SURGERY | Age: 47
End: 2017-05-22

## 2017-05-22 VITALS
DIASTOLIC BLOOD PRESSURE: 86 MMHG | HEART RATE: 85 BPM | BODY MASS INDEX: 34.37 KG/M2 | TEMPERATURE: 98.1 F | WEIGHT: 226.8 LBS | SYSTOLIC BLOOD PRESSURE: 153 MMHG | RESPIRATION RATE: 18 BRPM | HEIGHT: 68 IN

## 2017-05-22 DIAGNOSIS — S33.5XXD SPRAIN OF LIGAMENTS OF LUMBAR SPINE, SUBSEQUENT ENCOUNTER: ICD-10-CM

## 2017-05-22 DIAGNOSIS — M47.816 LUMBAR FACET ARTHROPATHY: Primary | ICD-10-CM

## 2017-05-22 DIAGNOSIS — M62.830 MUSCLE SPASM OF BACK: ICD-10-CM

## 2017-05-22 DIAGNOSIS — M75.121 COMPLETE TEAR OF RIGHT ROTATOR CUFF: ICD-10-CM

## 2017-05-22 RX ORDER — DICLOFENAC SODIUM 10 MG/G
GEL TOPICAL 4 TIMES DAILY
Qty: 5 EACH | Refills: 2 | Status: SHIPPED | OUTPATIENT
Start: 2017-05-22 | End: 2018-02-05 | Stop reason: SDUPTHER

## 2017-05-22 NOTE — MR AVS SNAPSHOT
Visit Information Date & Time Provider Department Dept. Phone Encounter #  
 5/22/2017  3:30 PM Dion Leal MD [address] [facility] [phone] [id number] Follow-up Instructions Return in about 2 months (around 7/22/2017) for Medication follow up. Upcoming Health Maintenance Date Due DTaP/Tdap/Td series (1 - Tdap) 5/19/1989 INFLUENZA AGE 9 TO ADULT 8/1/2017 Allergies as of 5/22/2017  Review Complete On: 5/22/2017 By: Dion Leal MD  
 No Known Allergies Current Immunizations  Never Reviewed No immunizations on file. Not reviewed this visit You Were Diagnosed With   
  
 Codes Comments Muscle spasm of back    -  Primary ICD-10-CM: U24.154 ICD-9-CM: 724.8 Lumbar facet arthropathy (HCC)     ICD-10-CM: M12.88 ICD-9-CM: 721.3 Complete tear of right rotator cuff     ICD-10-CM: M75.121 ICD-9-CM: 727.61 Vitals BP Pulse Temp Resp Height(growth percentile) Weight(growth percentile) 153/86 85 98.1 °F (36.7 °C) (Oral) 18 5' 8\" (1.727 m) 226 lb 12.8 oz (102.9 kg) BMI Smoking Status 34.48 kg/m2 Former Smoker BMI and BSA Data Body Mass Index Body Surface Area 34.48 kg/m 2 2.22 m 2 Preferred Pharmacy Pharmacy Name Phone [id number] [facility] [phone] Your Updated Medication List  
  
   
This list is accurate as of: 5/22/17  5:53 PM.  Always use your most recent med list.  
  
  
  
  
 albuterol 90 mcg/actuation inhaler Commonly known as:  PROVENTIL HFA, VENTOLIN HFA, PROAIR HFA Take  by inhalation. * diclofenac EC 75 mg EC tablet Commonly known as:  VOLTAREN Take 1 Tab by mouth two (2) times daily (with meals). * diclofenac 1 % Gel Commonly known as:  VOLTAREN Apply  to affected area four (4) times daily. Apply to shoulder 3-4 times daily as needed fenofibrate micronized 134 mg capsule Commonly known as:  LOFIBRA 134 mg daily. fluticasone 50 mcg/actuation nasal spray Commonly known as:  [place] Petite 2 Sprays by Both Nostrils route as needed. ibuprofen 200 mg tablet Commonly known as:  MOTRIN Take 200 mg by mouth.  
  
 lidocaine 5 % Commonly known as:  Jason Gerard Apply patch to the affected area for 12 hours a day and remove for 12 hours a day. lisinopril 40 mg tablet Commonly known as:  Ellen Ranjit Take 40 mg by mouth daily. Takes at 11 pm  
  
 metaxalone 800 mg tablet Commonly known as:  SKELAXIN Take 1 Tab by mouth three (3) times daily. Take as needed for muscle spasm  Indications: MUSCLE SPASM  
  
 traMADol 50 mg tablet Commonly known as:  ULTRAM  
Take 1 Tab by mouth every six (6) hours as needed for Pain. Max Daily Amount: 200 mg.  
  
 * Notice: This list has 2 medication(s) that are the same as other medications prescribed for you. Read the directions carefully, and ask your doctor or other care provider to review them with you. Prescriptions Printed Refills  
 diclofenac (VOLTAREN) 1 % gel 2 Sig: Apply  to affected area four (4) times daily. Apply to shoulder 3-4 times daily as needed Class: Print Route: Topical  
  
Follow-up Instructions Return in about 2 months (around 7/22/2017) for Medication follow up. Patient Instructions Low Back Arthritis: Exercises Your Care Instructions Here are some examples of typical rehabilitation exercises for your condition. Start each exercise slowly. Ease off the exercise if you start to have pain. Your doctor or physical therapist will tell you when you can start these exercises and which ones will work best for you.  
When you are not being active, find a comfortable position for rest. Some people are comfortable on the floor or a medium-firm bed with a small pillow under their head and another under their knees. Some people prefer to lie on their side with a pillow between their knees. Don't stay in one position for too long. Take short walks (10 to 20 minutes) every 2 to 3 hours. Avoid slopes, hills, and stairs until you feel better. Walk only distances you can manage without pain, especially leg pain. How to do the exercises Pelvic tilt 1. Lie on your back with your knees bent. 2. \"Brace\" your stomachtighten your muscles by pulling in and imagining your belly button moving toward your spine. 3. Press your lower back into the floor. You should feel your hips and pelvis rock back. 4. Hold for 6 seconds while breathing smoothly. 5. Relax and allow your pelvis and hips to rock forward. 6. Repeat 8 to 12 times. Back stretches 1. Get down on your hands and knees on the floor. 2. Relax your head and allow it to droop. Round your back up toward the ceiling until you feel a nice stretch in your upper, middle, and lower back. Hold this stretch for as long as it feels comfortable, or about 15 to 30 seconds. 3. Return to the starting position with a flat back while you are on your hands and knees. 4. Let your back sway by pressing your stomach toward the floor. Lift your buttocks toward the ceiling. 5. Hold this position for 15 to 30 seconds. 6. Repeat 2 to 4 times. Follow-up care is a key part of your treatment and safety. Be sure to make and go to all appointments, and call your doctor if you are having problems. It's also a good idea to know your test results and keep a list of the medicines you take. Where can you learn more? Go to http://aneudy-swati.info/. Enter X870 in the search box to learn more about \"Low Back Arthritis: Exercises. \" Current as of: May 23, 2016 Content Version: 11.2 © 5948-7429 t3n [employer].  Care instructions adapted under license by [employer] (which disclaims liability or warranty for this information). If you have questions about a medical condition or this instruction, always ask your healthcare professional. [address] any warranty or liability for your use of this information. Introducing [facility]! Dear Lucina Barrera: 
Thank you for requesting a TickTickTickets account. Our records indicate that you already have an active TickTickTickets account. You can access your account anytime at [email or web address] Did you know that you can access your hospital and ER discharge instructions at any time in TickTickTickets? You can also review all of your test results from your hospital stay or ER visit. Additional Information If you have questions, please visit the Frequently Asked Questions section of the TickTickTickets website at [email or web address] Remember, TickTickTickets is NOT to be used for urgent needs. For medical emergencies, dial 911. Now available from your iPhone and Android! Please provide this summary of care documentation to your next provider. Your primary care clinician is listed as [facility]. If you have any questions after today's visit, please call [phone].

## 2017-05-22 NOTE — PATIENT INSTRUCTIONS
Low Back Arthritis: Exercises  Your Care Instructions  Here are some examples of typical rehabilitation exercises for your condition. Start each exercise slowly. Ease off the exercise if you start to have pain. Your doctor or physical therapist will tell you when you can start these exercises and which ones will work best for you. When you are not being active, find a comfortable position for rest. Some people are comfortable on the floor or a medium-firm bed with a small pillow under their head and another under their knees. Some people prefer to lie on their side with a pillow between their knees. Don't stay in one position for too long. Take short walks (10 to 20 minutes) every 2 to 3 hours. Avoid slopes, hills, and stairs until you feel better. Walk only distances you can manage without pain, especially leg pain. How to do the exercises  Pelvic tilt    1. Lie on your back with your knees bent. 2. \"Brace\" your stomach--tighten your muscles by pulling in and imagining your belly button moving toward your spine. 3. Press your lower back into the floor. You should feel your hips and pelvis rock back. 4. Hold for 6 seconds while breathing smoothly. 5. Relax and allow your pelvis and hips to rock forward. 6. Repeat 8 to 12 times. Back stretches    1. Get down on your hands and knees on the floor. 2. Relax your head and allow it to droop. Round your back up toward the ceiling until you feel a nice stretch in your upper, middle, and lower back. Hold this stretch for as long as it feels comfortable, or about 15 to 30 seconds. 3. Return to the starting position with a flat back while you are on your hands and knees. 4. Let your back sway by pressing your stomach toward the floor. Lift your buttocks toward the ceiling. 5. Hold this position for 15 to 30 seconds. 6. Repeat 2 to 4 times. Follow-up care is a key part of your treatment and safety.  Be sure to make and go to all appointments, and call your doctor if you are having problems. It's also a good idea to know your test results and keep a list of the medicines you take. Where can you learn more? Go to http://aneudy-swati.info/. Enter O370 in the search box to learn more about \"Low Back Arthritis: Exercises. \"  Current as of: May 23, 2016  Content Version: 11.2  © 6486-3566 Sold. Care instructions adapted under license by [employer] (which disclaims liability or warranty for this information). If you have questions about a medical condition or this instruction, always ask your healthcare professional. [place] [address] any warranty or liability for your use of this information.

## 2017-05-22 NOTE — PROGRESS NOTES
[facility]  [address] [place], [facility] [address]  Phone: [phone]  Fax: [phone]      ASSESSMENT   Sarah Alvarado was seen today for back pain. Diagnoses and all orders for this visit:    Lumbar facet arthropathy (HCC)    Muscle spasm of back    Complete tear of right rotator cuff  -     diclofenac (VOLTAREN) 1 % gel; Apply  to affected area four (4) times daily. Apply to shoulder 3-4 times daily as needed       IMPRESSION AND PLAN:  Isai Sandra is a 50 y.o. male with history of lumbar pain. Pt had an FCE since his last office visit and per Dr. Candice Lee, he will start work soon but will proceed with his current work restrictions. He continues to experience relief in his lower back pain since trying a right L5-S1 facet injection on 01/25/2017. 30+ minutes of face to face contact was spent with the Pt during today's office visit extensively discussing his symptoms, treatment plan, FCE, impairment rating, current work restrictions, future injections, pain generators, his rotator cuff surgery, diet, weight loss, inflammatory medicine, concern for effects on liver and daily exercise      1) Pt was given information on lumbar arthritis exercises. 2) If Pt's inflammatory pain worsens and if unresponsive to conservative measures, he may benefit from an injection. 3) I recommended the Pt exercise regularly and instructed him to perform core strengthening exercises. 4) I recommended the Pt try beginner's yoga. 5) I recommended the Pt lose weight. 6) He was informed of proper lifting mechanics. 7) Pt was prescribed Voltaren 1% gel. 8) Mr. Nat Dickens has a reminder for a \"due or due soon\" health maintenance. I have asked that he contact his primary care provider, Ash Fuller MD (Inactive), for follow-up on this health maintenance. 9)  demonstrated consistency with prescribing. 10) Pt will follow-up 2 months.       HISTORY OF PRESENT ILLNESS:  Cora Amezcua is a 50 y.o. male with history of lumbar pain. Pt had an FCE since his last office visit. He reports that Dr. Mars Tavarez reviewed his FCE last week and will start work soon but will proceed with his current work restrictions. Although his left shoulder ROM has improved he reports that he has still has limited ROM in the left shoulder. He continues to experience relief in his lower back pain since trying a right L5-S1 facet injection on 01/25/2017. Pt reports that his lower back pain seems to dissipate with movement. Of note, Pt has started a ketogenic diet and reports overall improvement. He has discontinued Skelaxin 800 mg and Voltaren 75 mg since he is hesitant about taking medication. Pt at this time desires to proceed with medication evaluation. Pain Scale: 1/10    PCP: Golden Romero MD (Inactive)       Past Medical History:   Diagnosis Date    Chronic pain     back pain    Elevated cholesterol     Hypertension     Sleep apnea     CPAP        Social History     Social History    Marital status:      Spouse name: N/A    Number of children: N/A    Years of education: N/A     Occupational History    Not on file. Social History Main Topics    Smoking status: Former Smoker     Packs/day: 1.00     Years: 20.00     Quit date: 1/1/2003    Smokeless tobacco: Never Used    Alcohol use No    Drug use: No    Sexual activity: Not on file     Other Topics Concern    Not on file     Social History Narrative       Current Outpatient Prescriptions   Medication Sig Dispense Refill    diclofenac (VOLTAREN) 1 % gel Apply  to affected area four (4) times daily. Apply to shoulder 3-4 times daily as needed 5 Each 2    lidocaine (LIDODERM) 5 % Apply patch to the affected area for 12 hours a day and remove for 12 hours a day. 30 Patch 0    albuterol (PROVENTIL HFA, VENTOLIN HFA, PROAIR HFA) 90 mcg/actuation inhaler Take  by inhalation.       fenofibrate micronized (LOFIBRA) 134 mg capsule 134 mg daily. 1    fluticasone (FLONASE) 50 mcg/actuation nasal spray 2 Sprays by Both Nostrils route as needed. 1    lisinopril (PRINIVIL, ZESTRIL) 40 mg tablet Take 40 mg by mouth daily. Takes at 11 pm      metaxalone (SKELAXIN) 800 mg tablet Take 1 Tab by mouth three (3) times daily. Take as needed for muscle spasm  Indications: MUSCLE SPASM 75 Tab 2    diclofenac EC (VOLTAREN) 75 mg EC tablet Take 1 Tab by mouth two (2) times daily (with meals). 60 Tab 2    ibuprofen (MOTRIN) 200 mg tablet Take 200 mg by mouth.  traMADol (ULTRAM) 50 mg tablet Take 1 Tab by mouth every six (6) hours as needed for Pain. Max Daily Amount: 200 mg. 40 Tab 0       No Known Allergies      REVIEW OF SYSTEMS    Constitutional: Positive for weight loss. Negative for fever or chills. Respiratory: Negative for cough or shortness of breath. Cardiovascular: Negative for chest pain or palpitations. Gastrointestinal: Negative for acid reflux, change in bowel habits, or constipation. Genitourinary: Negative for dysuria and flank pain. Musculoskeletal: Positive for lumbar pain. Skin: Negative for rash. Neurological: Negative for headaches, dizziness, or numbness. Endo/Heme/Allergies: Negative for increased bruising. Psychiatric/Behavioral: Negative for difficulty with sleep. PHYSICAL EXAMINATION  Visit Vitals    /86    Pulse 85    Temp 98.1 °F (36.7 °C) (Oral)    Resp 18    Ht 5' 8\" (1.727 m)    Wt 226 lb 12.8 oz (102.9 kg)    BMI 34.48 kg/m2       Constitutional: Awake, alert, and in no acute distress  Neurological: 1+ symmetrical DTRs in the upper extremities. 1+ symmetrical DTRs in the lower extremities. Sensation to light touch is intact. Negative Chasity's sign bilaterally. Skin: warm, dry, and intact. Musculoskeletal: Minimal tenderness to palpation in the lower lumbar region. No pain with extension, axial loading, or forward flexion.  No pain with internal or external rotation of his hips. Negative straight leg raise bilaterally. Hip Flex  Quads Hamstrings Ankle DF EHL Ankle PF   Right +4/5 +4/5 +4/5 +4/5 +4/5 +4/5   Left +4/5 +4/5 +4/5 +4/5 +4/5 +4/5     IMAGING:    Lumbar Spine MRI from 10/15/2016 was personally reviewed with the Pt and demonstrated:  IMPRESSION:  L5-S1: Bilateral facet joint arthrosis with hypertrophy mildly narrowing the neural foramina. L4-5: Right facet joint hypertrophy and mild right disc bulging mildly narrowing the right neural foramen. Written by Radha Berman, as dictated by Apolinar Durham MD.  I, Dr. Apolinar Durham confirm that all documentation is accurate.

## 2017-05-26 ENCOUNTER — TELEPHONE (OUTPATIENT)
Dept: ORTHOPEDIC SURGERY | Age: 47
End: 2017-05-26

## 2017-05-26 ENCOUNTER — DOCUMENTATION ONLY (OUTPATIENT)
Dept: ORTHOPEDIC SURGERY | Age: 47
End: 2017-05-26

## 2017-05-26 NOTE — TELEPHONE ENCOUNTER
Dori Huerta located this form and gave it to Romana Pierini to fill out. She contacted the patient as well.

## 2017-05-26 NOTE — PROGRESS NOTES
[place] Dept of [place] attending Physician's report completed & patient called to  at [facility].

## 2017-05-31 ENCOUNTER — TELEPHONE (OUTPATIENT)
Dept: ORTHOPEDIC SURGERY | Age: 47
End: 2017-05-31

## 2017-06-07 ENCOUNTER — HOSPITAL ENCOUNTER (OUTPATIENT)
Dept: PHYSICAL THERAPY | Age: 47
Discharge: HOME | End: 2017-06-07
Payer: OTHER GOVERNMENT

## 2017-06-07 PROCEDURE — 97750 PHYSICAL PERFORMANCE TEST: CPT

## 2017-06-07 NOTE — PROGRESS NOTES
PHYSICAL THERAPY - DAILY TREATMENT NOTE    Patient Name: David Call        Date: 2017  : 1970    Patient  Verified: yes  Visit #:   1   of   1  Insurance: Payor: DEPARTMENT OF LABOR / Plan: [facility] / Product Type: Workers Comp /      In time: 56 Out time: 12   Total Treatment Time: 28     Medicare Time Tracking (below)   Total Timed Codes (min):   1:1 Treatment Time:       TREATMENT AREA =  Complete rotator cuff tear or rupture of right shoulder, not specified as traumatic [M75.121]    SUBJECTIVE  Pain Level (on 0 to 10 scale):  -  / 10   Medication Changes/New allergies or changes in medical history, any new surgeries or procedures?     NO    If yes, update Summary List   Subjective Functional Status/Changes:  []  No changes reported     See impairment report in chart         OBJECTIVE  Modalities Rationale: to decrease pain, edema, neural compromise in order to perform ADLs/ rest with improved ease        min [] Estim, type/location:                                      []  att     []  unatt     []  w/US     []  w/ice    []  w/heat    min []  Mechanical Traction: type/lbs                   []  pro   []  sup   []  int   []  cont    []  before manual    []  after manual    min []  Ultrasound, settings/location:      min []  Iontophoresis w/ dexamethasone, location:                                               []  take home patch       []  in clinic    min []  Ice     []  Heat    location/position:     min []  Vasopneumatic Device, press/temp:     min []  Other:    [] Skin assessment post-treatment (if applicable):    []  intact    []  redness- no adverse reaction     []redness  adverse reaction:         min Therapeutic Exercise:  []  See flow sheet   Rationale:      increase ROM and increase strength to improve the patients ability to perform ADLs      min Manual Therapy:    Rationale:      decrease pain, increase ROM, increase tissue extensibility and decrease trigger points to improve patient's ability to perform ADLs     min Neuromuscular Re-ed:    Rationale:    improve coordination, improve balance, increase proprioception and improved posture, improve motor control to improve the patients ability to perform ADLs     min Gait Training:    Rationale:       min Patient Education:  YES  Reviewed HEP   []  Progressed/Changed HEP based on:         Other Objective/Functional Measures:    See Impairment rating report in chart     Post Treatment Pain Level (on 0 to 10) scale:   -  / 10     ASSESSMENT      [x]  See POC     PLAN  [x]  Upgrade activities as tolerated  Continue plan of care: yes   []  Discharge due to :    []  Other:      Therapist: Carrie Burch, RA[facility]    Date: 6/7/2017 Time: 12:21 PM     Future Appointments  Date Time Provider [place]   8/1/2017 3:30 PM Addie Rosenbaum  [address]

## 2017-07-26 ENCOUNTER — OFFICE VISIT (OUTPATIENT)
Dept: ORTHOPEDIC SURGERY | Age: 47
End: 2017-07-26

## 2017-07-26 ENCOUNTER — DOCUMENTATION ONLY (OUTPATIENT)
Dept: ORTHOPEDIC SURGERY | Age: 47
End: 2017-07-26

## 2017-07-26 VITALS
HEART RATE: 76 BPM | BODY MASS INDEX: 32.83 KG/M2 | OXYGEN SATURATION: 98 % | WEIGHT: 216.6 LBS | RESPIRATION RATE: 18 BRPM | TEMPERATURE: 97.9 F | SYSTOLIC BLOOD PRESSURE: 135 MMHG | HEIGHT: 68 IN | DIASTOLIC BLOOD PRESSURE: 78 MMHG

## 2017-07-26 DIAGNOSIS — M75.121 COMPLETE TEAR OF RIGHT ROTATOR CUFF: Primary | ICD-10-CM

## 2017-07-26 DIAGNOSIS — Z98.890 STATUS POST ROTATOR CUFF REPAIR: ICD-10-CM

## 2017-07-26 NOTE — PROGRESS NOTES
Jagdeep Serrato  1970   Chief Complaint   Patient presents with    Shoulder Pain     Right        HISTORY OF PRESENT ILLNESS  Jagdeep Serrato is a 50 y.o. male who presents today for reevaluation of right shoulder. He rates his pain 1/10 today. He is s/p right shoulder arthroscopic massive rotator cuff repair and open subscapularis repair on 9/2/16. He is back at work with restrictions. He has completed the FCE and impairment rating. Imapairment rating came back as 5% of the upper extremity 3% of the whole person. Patient still does not feel he is at 100%. He states he still has episodes of dropping objects. Patient denies any fever, chills, chest pain, shortness of breath or calf pain. There are no new illness or injuries to report since last seen in the office. There are no changes to medications, allergies, family or social history. PHYSICAL EXAM:   Visit Vitals    /78    Pulse 76    Temp 97.9 °F (36.6 °C) (Oral)    Resp 18    Ht 5' 8\" (1.727 m)    Wt 216 lb 9.6 oz (98.2 kg)    SpO2 98%    BMI 32.93 kg/m2     The patient is a well-developed, well-nourished male   in no acute distress. The patient is alert and oriented times three. The patient is alert and oriented times three. Mood and affect are normal.  LYMPHATIC: lymph nodes are not enlarged and are within normal limits  SKIN: normal in color and non tender to palpation. There are no bruises or abrasions noted. NEUROLOGICAL: Motor sensory exam is within normal limits. Reflexes are equal bilaterally. There is normal sensation to pinprick and light touch  ORTHOPEDIC EXAM of right shoulder:  Inspection: No swelling, no bruising, effusion not present, no edema  Incision healed  Range of motion: 0-90 passive glenohumeral abduction, able to touch back of head without compensation. No ttp   Stability: Stable  Strength: 4/5     IMPRESSION:      ICD-10-CM ICD-9-CM    1. Complete tear of right rotator cuff M75.121 727.61    2. Status post rotator cuff repair Z98.890 V45.89         PLAN:   1. I discussed the completed impairment rating with the patient. During his last visit, the FCE was discussed at length with him in front of the rehab nurse. At that point, the FCE had suggested that he was capable of full duty and he had no objections to this. Now, he claims he did not understand the process, and he has some occasional tingling around the shoulder. I am at a loss to explain any new tingling in the shoulder area. I stand by the FCE in that he should be able to get back to full work. Currently, I agree with the impairment rating. Risk factors include: hypertension  2. No cortisone injection indicated today   3. No Physical/Occupational Therapy indicated today  4. No diagnostic test indicated today  5. No durable medical equipment indicated today  6. No referral indicated today   7. No medications indicated today  8. No Narcotic indicated today. RTC PRN    Follow-up Disposition: Not on File    Scribed by Doug Samano [address]) as dictated by Sangita Beal MD    I, Dr. Sangita Beal, confirm that all documentation is accurate.     Sangita Beal M.D.   [facility]

## 2017-07-26 NOTE — LETTER
NOTIFICATION RETURN TO WORK / SCHOOL 
 
7/26/2017 4:29 PM 
 
Mr. Fischer [place] [address] To Whom It May Concern: 
 
Bettye Cortes is currently under the care of [address] [place] [address] [place]. He is able to return to work on 7/27/17. The FCE shows good capabilities and effort. He is now able to return to regular work with no restrictions. If there are questions or concerns please have the patient contact our office. Sincerely, Kulwinder Hauser MD

## 2017-07-26 NOTE — PATIENT INSTRUCTIONS
Joint Pain: Care Instructions  Your Care Instructions  Many people have small aches and pains from overuse or injury to muscles and joints. Joint injuries often happen during sports or recreation, work tasks, or projects around the home. An overuse injury can happen when you put too much stress on a joint or when you do an activity that stresses the joint over and over, such as using the computer or rowing a boat. You can take action at home to help your muscles and joints get better. You should feel better in 1 to 2 weeks, but it can take 3 months or more to heal completely. Follow-up care is a key part of your treatment and safety. Be sure to make and go to all appointments, and call your doctor if you are having problems. It's also a good idea to know your test results and keep a list of the medicines you take. How can you care for yourself at home? · Do not put weight on the injured joint for at least a day or two. · For the first day or two after an injury, do not take hot showers or baths, and do not use hot packs. The heat could make swelling worse. · Put ice or a cold pack on the sore joint for 10 to 20 minutes at a time. Try to do this every 1 to 2 hours for the next 3 days (when you are awake) or until the swelling goes down. Put a thin cloth between the ice and your skin. · Wrap the injury in an elastic bandage. Do not wrap it too tightly because this can cause more swelling. · Prop up the sore joint on a pillow when you ice it or anytime you sit or lie down during the next 3 days. Try to keep it above the level of your heart. This will help reduce swelling. · Take an over-the-counter pain medicine, such as acetaminophen (Tylenol), ibuprofen (Advil, Motrin), or naproxen (Aleve). Read and follow all instructions on the label. · After 1 or 2 days of rest, begin moving the joint gently.  While the joint is still healing, you can begin to exercise using activities that do not strain or hurt the painful joint. When should you call for help? Call your doctor now or seek immediate medical care if:  · You have signs of infection, such as:  ¨ Increased pain, swelling, warmth, and redness. ¨ Red streaks leading from the joint. ¨ A fever. Watch closely for changes in your health, and be sure to contact your doctor if:  · Your movement or symptoms are not getting better after 1 to 2 weeks of home treatment. Where can you learn more? Go to http://aneudy-swati.info/. Enter P205 in the search box to learn more about \"Joint Pain: Care Instructions. \"  Current as of: March 21, 2017  Content Version: 11.3  © 8141-5917 [employer]. Care instructions adapted under license by [employer] (which disclaims liability or warranty for this information). If you have questions about a medical condition or this instruction, always ask your healthcare professional. [address] any warranty or liability for your use of this information.

## 2017-07-26 NOTE — PROGRESS NOTES
Patient dropped off form from [id number] [facility] for Dr. Rosalinda Swan to complete. Please call for  when ready.

## 2017-08-01 ENCOUNTER — OFFICE VISIT (OUTPATIENT)
Dept: ORTHOPEDIC SURGERY | Age: 47
End: 2017-08-01

## 2017-08-01 ENCOUNTER — DOCUMENTATION ONLY (OUTPATIENT)
Dept: ORTHOPEDIC SURGERY | Age: 47
End: 2017-08-01

## 2017-08-01 VITALS
HEART RATE: 71 BPM | TEMPERATURE: 98.3 F | DIASTOLIC BLOOD PRESSURE: 74 MMHG | HEIGHT: 68 IN | BODY MASS INDEX: 32.74 KG/M2 | SYSTOLIC BLOOD PRESSURE: 121 MMHG | WEIGHT: 216 LBS

## 2017-08-01 DIAGNOSIS — S33.5XXD SPRAIN OF LIGAMENTS OF LUMBAR SPINE, SUBSEQUENT ENCOUNTER: Primary | ICD-10-CM

## 2017-08-01 DIAGNOSIS — M75.101 ROTATOR CUFF SYNDROME OF RIGHT SHOULDER: ICD-10-CM

## 2017-08-01 DIAGNOSIS — M47.816 LUMBAR FACET ARTHROPATHY: ICD-10-CM

## 2017-08-01 DIAGNOSIS — M62.830 MUSCLE SPASM OF BACK: ICD-10-CM

## 2017-08-01 NOTE — MR AVS SNAPSHOT
Visit Information Date & Time Provider Department Dept. Phone Encounter #  
 8/1/2017  3:30 PM Areli Valencia MD [facility] [phone] [id number] Follow-up Instructions Return if symptoms worsen or fail to improve. Upcoming Health Maintenance Date Due DTaP/Tdap/Td series (1 - Tdap) 5/19/1989 INFLUENZA AGE 9 TO ADULT 8/1/2017 Allergies as of 8/1/2017  Review Complete On: 8/1/2017 By: Areli Valencia MD  
 No Known Allergies Current Immunizations  Never Reviewed No immunizations on file. Not reviewed this visit You Were Diagnosed With   
  
 Codes Comments Sprain of ligaments of lumbar spine, subsequent encounter    -  Primary ICD-10-CM: S33. 5XXD ICD-9-CM: V58.89, 847.2 Lumbar facet arthropathy     ICD-10-CM: M12.88 ICD-9-CM: 721.3 Muscle spasm of back     ICD-10-CM: U06.002 ICD-9-CM: 724.8 Rotator cuff syndrome of right shoulder     ICD-10-CM: M75.101 ICD-9-CM: 726.10 Vitals BP Pulse Temp Height(growth percentile) Weight(growth percentile) BMI  
 121/74 71 98.3 °F (36.8 °C) (Oral) 5' 8\" (1.727 m) 216 lb (98 kg) 32.84 kg/m2 Smoking Status Former Smoker Vitals History BMI and BSA Data Body Mass Index Body Surface Area  
 32.84 kg/m 2 2.17 m 2 Preferred Pharmacy Pharmacy Name Phone [id number] [address] [facility] [phone] Your Updated Medication List  
  
   
This list is accurate as of: 8/1/17  4:59 PM.  Always use your most recent med list.  
  
  
  
  
 albuterol 90 mcg/actuation inhaler Commonly known as:  PROVENTIL HFA, VENTOLIN HFA, PROAIR HFA Take  by inhalation. * diclofenac EC 75 mg EC tablet Commonly known as:  VOLTAREN Take 1 Tab by mouth two (2) times daily (with meals). * diclofenac 1 % Gel Commonly known as:  VOLTAREN  
 Apply  to affected area four (4) times daily. Apply to shoulder 3-4 times daily as needed  
  
 fenofibrate micronized 134 mg capsule Commonly known as:  LOFIBRA 134 mg daily. fluticasone 50 mcg/actuation nasal spray Commonly known as:  Max Myton 2 Sprays by Both Nostrils route as needed. ibuprofen 200 mg tablet Commonly known as:  MOTRIN Take 200 mg by mouth.  
  
 lidocaine 5 % Commonly known as:  Danlulúl Chris Apply patch to the affected area for 12 hours a day and remove for 12 hours a day. lisinopril 40 mg tablet Commonly known as:  Gianni Myesha Take 40 mg by mouth daily. Takes at 11 pm  
  
 metaxalone 800 mg tablet Commonly known as:  SKELAXIN Take 1 Tab by mouth three (3) times daily. Take as needed for muscle spasm  Indications: MUSCLE SPASM  
  
 traMADol 50 mg tablet Commonly known as:  ULTRAM  
Take 1 Tab by mouth every six (6) hours as needed for Pain. Max Daily Amount: 200 mg.  
  
 * Notice: This list has 2 medication(s) that are the same as other medications prescribed for you. Read the directions carefully, and ask your doctor or other care provider to review them with you. Follow-up Instructions Return if symptoms worsen or fail to improve. Patient Instructions Low Back Arthritis: Exercises Your Care Instructions Here are some examples of typical rehabilitation exercises for your condition. Start each exercise slowly. Ease off the exercise if you start to have pain. Your doctor or physical therapist will tell you when you can start these exercises and which ones will work best for you. When you are not being active, find a comfortable position for rest. Some people are comfortable on the floor or a medium-firm bed with a small pillow under their head and another under their knees. Some people prefer to lie on their side with a pillow between their knees. Don't stay in one position for too long. Take short walks (10 to 20 minutes) every 2 to 3 hours. Avoid slopes, hills, and stairs until you feel better. Walk only distances you can manage without pain, especially leg pain. How to do the exercises Pelvic tilt 1. Lie on your back with your knees bent. 2. \"Brace\" your stomachtighten your muscles by pulling in and imagining your belly button moving toward your spine. 3. Press your lower back into the floor. You should feel your hips and pelvis rock back. 4. Hold for 6 seconds while breathing smoothly. 5. Relax and allow your pelvis and hips to rock forward. 6. Repeat 8 to 12 times. Back stretches 1. Get down on your hands and knees on the floor. 2. Relax your head and allow it to droop. Round your back up toward the ceiling until you feel a nice stretch in your upper, middle, and lower back. Hold this stretch for as long as it feels comfortable, or about 15 to 30 seconds. 3. Return to the starting position with a flat back while you are on your hands and knees. 4. Let your back sway by pressing your stomach toward the floor. Lift your buttocks toward the ceiling. 5. Hold this position for 15 to 30 seconds. 6. Repeat 2 to 4 times. Follow-up care is a key part of your treatment and safety. Be sure to make and go to all appointments, and call your doctor if you are having problems. It's also a good idea to know your test results and keep a list of the medicines you take. Where can you learn more? Go to http://aneudy-swati.info/. Enter H324 in the search box to learn more about \"Low Back Arthritis: Exercises. \" Current as of: March 21, 2017 Content Version: 11.3 © 7299-7393 [employer]. Care instructions adapted under license by [employer] (which disclaims liability or warranty for this information).  If you have questions about a medical condition or this instruction, always ask your healthcare professional. [employer] disclaims any warranty or liability for your use of this information. Introducing [facility]! Dear Ok Galicia: 
Thank you for requesting a zhiwo account. Our records indicate that you already have an active zhiwo account. You can access your account anytime at [email or web address] Did you know that you can access your hospital and ER discharge instructions at any time in zhiwo? You can also review all of your test results from your hospital stay or ER visit. Additional Information If you have questions, please visit the Frequently Asked Questions section of the zhiwo website at [email or web address] Remember, zhiwo is NOT to be used for urgent needs. For medical emergencies, dial 911. Now available from your iPhone and Android! Please provide this summary of care documentation to your next provider. Your primary care clinician is listed as [facility]. If you have any questions after today's visit, please call [phone].

## 2017-08-01 NOTE — PATIENT INSTRUCTIONS

## 2017-08-01 NOTE — PROGRESS NOTES
[facility]  [address] [place], [facility] [place] [address]  Phone: [phone]  Fax: [phone]      ASSESSMENT   Diagnoses and all orders for this visit:    1. Sprain of ligaments of lumbar spine, subsequent encounter    2. Lumbar facet arthropathy    3. Muscle spasm of back    4. Rotator cuff syndrome of right shoulder         IMPRESSION AND PLAN:  Kanu King is a 50 y.o. male with history of lumbar pain. Pt reports that he has started work since his last office visit. Pt reports that he thinks he has reached a plateau state with the left shoulder and has difficulty lifting the full 40 lbs as noted in his job description. More than 30 minutes of face to face contact was spent with the Pt during today's office visit extensively discussing his symptoms, therapy  and treatment plan. 1) Pt was given information on lumbar arthritis exercises. 2) I recommended the Pt try water exercise and water exercise. 3) He will continue with exercises learned through physical therapy. 4) Mr. Judy Cai has a reminder for a \"due or due soon\" health maintenance. I have asked that he contact his primary care provider, Beulah Ramirez MD (Inactive), for follow-up on this health maintenance. 5)  demonstrated consistency with prescribing. 6) Pt will follow-up as needed. HISTORY OF PRESENT ILLNESS:  Kanu King is a 50 y.o. male with history of lumbar pain. Pt reports that he has started work since his last office visit. He had prior left shoulder surgery with Dr. Loretta Garber on 09/02/2016. Pt states that his restrictions were lifted on Wednesday with his last office visit with Dr. Lindsey Benavides. His restrictions included no lifting greater than 10 lbs, no pushing, no pulling, and no climbing vertical ladders.  Pt reports that he thinks he has reached a plateau state with the left shoulder and has difficulty lifting the full 45 lbs as noted in his job description. Pt reports that he experiences severe right shoulder never pain if someone lightly pats his right shoulder. He has been using Voltaren 1% gel on his left shoulder and lower back. Pt reports that he has continued with his low carb diet. He continues to use Lidocaine patches. Pt at this time desires to continue with current care. Pt is a non smoker and is not diabetic. Pain Scale: 2/10    PCP: Yuan Dennis MD (Inactive)       Past Medical History:   Diagnosis Date    Chronic pain     back pain    Elevated cholesterol     Hypertension     Sleep apnea     CPAP        Social History     Social History    Marital status:      Spouse name: N/A    Number of children: N/A    Years of education: N/A     Occupational History    Not on file. Social History Main Topics    Smoking status: Former Smoker     Packs/day: 1.00     Years: 20.00     Quit date: 1/1/2003    Smokeless tobacco: Never Used    Alcohol use No    Drug use: No    Sexual activity: Not on file     Other Topics Concern    Not on file     Social History Narrative       Current Outpatient Prescriptions   Medication Sig Dispense Refill    diclofenac (VOLTAREN) 1 % gel Apply  to affected area four (4) times daily. Apply to shoulder 3-4 times daily as needed 5 Each 2    albuterol (PROVENTIL HFA, VENTOLIN HFA, PROAIR HFA) 90 mcg/actuation inhaler Take  by inhalation.  fenofibrate micronized (LOFIBRA) 134 mg capsule 134 mg daily. 1    fluticasone (FLONASE) 50 mcg/actuation nasal spray 2 Sprays by Both Nostrils route as needed. 1    lisinopril (PRINIVIL, ZESTRIL) 40 mg tablet Take 40 mg by mouth daily. Takes at 11 pm      metaxalone (SKELAXIN) 800 mg tablet Take 1 Tab by mouth three (3) times daily. Take as needed for muscle spasm  Indications: MUSCLE SPASM 75 Tab 2    diclofenac EC (VOLTAREN) 75 mg EC tablet Take 1 Tab by mouth two (2) times daily (with meals).  60 Tab 2    ibuprofen (MOTRIN) 200 mg tablet Take 200 mg by mouth.  lidocaine (LIDODERM) 5 % Apply patch to the affected area for 12 hours a day and remove for 12 hours a day. 30 Patch 0    traMADol (ULTRAM) 50 mg tablet Take 1 Tab by mouth every six (6) hours as needed for Pain. Max Daily Amount: 200 mg. 40 Tab 0       No Known Allergies      REVIEW OF SYSTEMS    Constitutional: Negative for fever, chills, or weight change. Respiratory: Negative for cough or shortness of breath. Cardiovascular: Negative for chest pain or palpitations. Gastrointestinal: Negative for acid reflux, change in bowel habits, or constipation. Genitourinary: Negative for dysuria and flank pain. Musculoskeletal: Positive for lumbar pain. Skin: Negative for rash. Neurological: Negative for headaches, dizziness, or numbness. Endo/Heme/Allergies: Negative for increased bruising. Psychiatric/Behavioral: Negative for difficulty with sleep. PHYSICAL EXAMINATION  Visit Vitals    /74    Pulse 71    Temp 98.3 °F (36.8 °C) (Oral)    Ht 5' 8\" (1.727 m)    Wt 216 lb (98 kg)    BMI 32.84 kg/m2       Constitutional: Awake, alert, and in no acute distress  Neurological: 1+ symmetrical DTRs in the lower extremities. Sensation to light touch is intact. Skin: warm, dry, and intact. Musculoskeletal: Tenderness to palpation in the lower lumbar region. Moderate pain with extension and axial loading. No pain with internal or external rotation of his hips. Mild pain transitioning sit to stand. Negative straight leg raise bilaterally. Hip Flex  Quads Hamstrings Ankle DF EHL Ankle PF   Right +4/5 +4/5 +4/5 +4/5 +4/5 +4/5   Left +4/5 +4/5 +4/5 +4/5 +4/5 +4/5     IMAGING:    Lumbar Spine MRI from 10/15/2016 was personally reviewed with the Pt and demonstrated:  IMPRESSION:  L5-S1: Bilateral facet joint arthrosis with hypertrophy mildly narrowing the neural foramina.    L4-5: Right facet joint hypertrophy and mild right disc bulging mildly narrowing the right neural foramen. Written by Darby Wynn, as dictated by Sharif Gibbons MD.  I, Dr. Sharif Gibbons confirm that all documentation is accurate.

## 2017-08-01 NOTE — PROGRESS NOTES
Patient dropped off two [employer] forms for us to complete (Attending Physician's Report and Work Capacity Eval.) Informed patient of the 6-06 business day policy. States he would like to  forms upon completion.

## 2017-08-08 ENCOUNTER — DOCUMENTATION ONLY (OUTPATIENT)
Dept: ORTHOPEDIC SURGERY | Age: 47
End: 2017-08-08

## 2017-08-08 NOTE — PROGRESS NOTES
[employer] Dis[employer] form completed & patient called to  at [facility].

## 2018-02-05 DIAGNOSIS — M75.121 COMPLETE TEAR OF RIGHT ROTATOR CUFF: ICD-10-CM

## 2018-02-06 RX ORDER — DICLOFENAC SODIUM 10 MG/G
GEL TOPICAL
Qty: 500 G | Refills: 2 | Status: SHIPPED | OUTPATIENT
Start: 2018-02-06

## 2018-02-06 NOTE — TELEPHONE ENCOUNTER
Called patient, verified , informed of below message from [facility], informed of med being sent to [facility] on [address], patient verbalized agreement/understanding. No further action required at this time.

## 2023-03-23 NOTE — TELEPHONE ENCOUNTER
Patient contacted and he states that he does not need a letter.
Please give note
Pt called in requesting a continuance letter for work  because his other one has . Pt made and upcoming appt for 17 for FCE being done and f/u. Pt states letter only needs to last until that appt. He is hoping to be at full duty once his appt on 17.     Pt can be reached at [phone]
No

## (undated) DEVICE — (D)BNDG ADHESIVE FABRIC 3/4X3 -- DISC BY MFR USE ITEM 357960

## (undated) DEVICE — (D)SYR 10ML 1/5ML GRAD NSAF -- PKGING CHANGE USE ITEM 338027

## (undated) DEVICE — TRAY MYEL SFTY +

## (undated) DEVICE — MEDIA CONTRAST 10ML 200MG/ML 41%